# Patient Record
Sex: MALE | Race: WHITE | Employment: OTHER | URBAN - METROPOLITAN AREA
[De-identification: names, ages, dates, MRNs, and addresses within clinical notes are randomized per-mention and may not be internally consistent; named-entity substitution may affect disease eponyms.]

---

## 2017-01-03 ENCOUNTER — HOSPITAL ENCOUNTER (OUTPATIENT)
Dept: SLEEP MEDICINE | Age: 82
Discharge: HOME | End: 2017-01-05
Admitting: INTERNAL MEDICINE

## 2017-01-03 ENCOUNTER — OFFICE VISIT (OUTPATIENT)
Dept: SLEEP MEDICINE | Age: 82
End: 2017-01-03

## 2017-01-03 VITALS — OXYGEN SATURATION: 93 % | HEART RATE: 118 BPM | BODY MASS INDEX: 25.05 KG/M2 | WEIGHT: 175 LBS | HEIGHT: 70 IN

## 2017-01-03 DIAGNOSIS — K21.9 GERD WITHOUT ESOPHAGITIS: Chronic | ICD-10-CM

## 2017-01-03 DIAGNOSIS — G47.33 OBSTRUCTIVE SLEEP APNEA SYNDROME: Primary | ICD-10-CM

## 2017-01-03 DIAGNOSIS — G47.33 OBSTRUCTIVE SLEEP APNEA SYNDROME: ICD-10-CM

## 2017-01-03 DIAGNOSIS — I10 HYPERTENSION, ESSENTIAL: Chronic | ICD-10-CM

## 2017-01-03 DIAGNOSIS — E11.9 CONTROLLED TYPE 2 DIABETES MELLITUS WITHOUT COMPLICATION, UNSPECIFIED LONG TERM INSULIN USE STATUS: Chronic | ICD-10-CM

## 2017-01-03 PROCEDURE — 95811 POLYSOM 6/>YRS CPAP 4/> PARM: CPT | Performed by: INTERNAL MEDICINE

## 2017-01-03 PROCEDURE — 99214 OFFICE O/P EST MOD 30 MIN: CPT | Performed by: INTERNAL MEDICINE

## 2017-01-03 ASSESSMENT — ENCOUNTER SYMPTOMS
ABDOMINAL DISTENTION: 0
CHEST TIGHTNESS: 0
SHORTNESS OF BREATH: 0
SINUS PRESSURE: 0
STRIDOR: 0
ABDOMINAL PAIN: 0
APNEA: 1
EYE PAIN: 0
NAUSEA: 0
PHOTOPHOBIA: 0

## 2017-01-09 ENCOUNTER — TELEPHONE (OUTPATIENT)
Dept: SLEEP MEDICINE | Age: 82
End: 2017-01-09

## 2017-01-10 ENCOUNTER — TELEPHONE (OUTPATIENT)
Dept: SLEEP MEDICINE | Age: 82
End: 2017-01-10

## 2017-01-16 ENCOUNTER — TELEPHONE (OUTPATIENT)
Dept: SLEEP MEDICINE | Age: 82
End: 2017-01-16

## 2017-01-19 ENCOUNTER — TELEPHONE (OUTPATIENT)
Dept: SLEEP MEDICINE | Age: 82
End: 2017-01-19

## 2017-01-23 ENCOUNTER — TELEPHONE (OUTPATIENT)
Dept: SLEEP MEDICINE | Age: 82
End: 2017-01-23

## 2017-01-24 ENCOUNTER — TELEPHONE (OUTPATIENT)
Dept: SLEEP MEDICINE | Age: 82
End: 2017-01-24

## 2017-02-14 ENCOUNTER — HOSPITAL ENCOUNTER (OUTPATIENT)
Dept: OTHER | Age: 82
Discharge: HOME | End: 2017-02-14
Attending: FAMILY MEDICINE | Admitting: FAMILY MEDICINE

## 2017-02-14 LAB
A/G RATIO: 1.5 (ref 1.1–2.2)
ALBUMIN SERPL-MCNC: 4.2 G/DL (ref 3.4–5)
ALP BLD-CCNC: 76 U/L (ref 40–129)
ALT SERPL-CCNC: 17 U/L (ref 10–40)
ANION GAP SERPL CALCULATED.3IONS-SCNC: 15 MMOL/L (ref 3–16)
AST SERPL-CCNC: 23 U/L (ref 15–37)
BASOPHILS ABSOLUTE: 0 K/UL (ref 0–0.2)
BASOPHILS RELATIVE PERCENT: 0.5 %
BILIRUB SERPL-MCNC: 0.6 MG/DL (ref 0–1)
BUN BLDV-MCNC: 16 MG/DL (ref 7–20)
CALCIUM SERPL-MCNC: 9.5 MG/DL (ref 8.3–10.6)
CHLORIDE BLD-SCNC: 100 MMOL/L (ref 99–110)
CHOLESTEROL, TOTAL: 186 MG/DL (ref 0–199)
CO2: 25 MMOL/L (ref 21–32)
CREAT SERPL-MCNC: 0.9 MG/DL (ref 0.8–1.3)
EOSINOPHILS ABSOLUTE: 0.3 K/UL (ref 0–0.6)
EOSINOPHILS RELATIVE PERCENT: 5.2 %
GFR AFRICAN AMERICAN: >60
GFR NON-AFRICAN AMERICAN: >60
GLOBULIN: 2.8 G/DL
GLUCOSE BLD-MCNC: 92 MG/DL (ref 70–99)
HCT VFR BLD CALC: 44.1 % (ref 40.5–52.5)
HDLC SERPL-MCNC: 102 MG/DL (ref 40–60)
HEMOGLOBIN: 14 G/DL (ref 13.5–17.5)
LDL CHOLESTEROL CALCULATED: 73 MG/DL
LYMPHOCYTES ABSOLUTE: 1.4 K/UL (ref 1–5.1)
LYMPHOCYTES RELATIVE PERCENT: 27.7 %
MCH RBC QN AUTO: 31 PG (ref 26–34)
MCHC RBC AUTO-ENTMCNC: 31.8 G/DL (ref 31–36)
MCV RBC AUTO: 97.4 FL (ref 80–100)
MONOCYTES ABSOLUTE: 0.7 K/UL (ref 0–1.3)
MONOCYTES RELATIVE PERCENT: 14.2 %
NEUTROPHILS ABSOLUTE: 2.7 K/UL (ref 1.7–7.7)
NEUTROPHILS RELATIVE PERCENT: 52.4 %
PDW BLD-RTO: 17.9 % (ref 12.4–15.4)
PLATELET # BLD: 185 K/UL (ref 135–450)
PMV BLD AUTO: 8.6 FL (ref 5–10.5)
POTASSIUM SERPL-SCNC: 4.7 MMOL/L (ref 3.5–5.1)
RBC # BLD: 4.53 M/UL (ref 4.2–5.9)
SODIUM BLD-SCNC: 140 MMOL/L (ref 136–145)
TOTAL PROTEIN: 7 G/DL (ref 6.4–8.2)
TRIGL SERPL-MCNC: 56 MG/DL (ref 0–150)
TSH SERPL DL<=0.05 MIU/L-ACNC: 1.22 UIU/ML (ref 0.27–4.2)
VITAMIN D 25-HYDROXY: 40.9 NG/ML
VLDLC SERPL CALC-MCNC: 11 MG/DL
WBC # BLD: 5.1 K/UL (ref 4–11)

## 2017-02-15 LAB
ESTIMATED AVERAGE GLUCOSE: 102.5 MG/DL
HBA1C MFR BLD: 5.2 %

## 2017-02-20 ENCOUNTER — TELEPHONE (OUTPATIENT)
Dept: SLEEP MEDICINE | Age: 82
End: 2017-02-20

## 2017-03-17 ENCOUNTER — OFFICE VISIT (OUTPATIENT)
Dept: SLEEP MEDICINE | Age: 82
End: 2017-03-17

## 2017-03-17 VITALS
HEIGHT: 70 IN | DIASTOLIC BLOOD PRESSURE: 99 MMHG | HEART RATE: 105 BPM | SYSTOLIC BLOOD PRESSURE: 142 MMHG | OXYGEN SATURATION: 92 % | WEIGHT: 180 LBS | BODY MASS INDEX: 25.77 KG/M2

## 2017-03-17 DIAGNOSIS — G47.33 OBSTRUCTIVE SLEEP APNEA SYNDROME: Primary | Chronic | ICD-10-CM

## 2017-03-17 DIAGNOSIS — G47.50 PARASOMNIA: Chronic | ICD-10-CM

## 2017-03-17 DIAGNOSIS — E11.9 CONTROLLED TYPE 2 DIABETES MELLITUS WITHOUT COMPLICATION, UNSPECIFIED LONG TERM INSULIN USE STATUS: Chronic | ICD-10-CM

## 2017-03-17 DIAGNOSIS — I10 HYPERTENSION, ESSENTIAL: Chronic | ICD-10-CM

## 2017-03-17 PROCEDURE — 99214 OFFICE O/P EST MOD 30 MIN: CPT | Performed by: INTERNAL MEDICINE

## 2017-03-17 ASSESSMENT — SLEEP AND FATIGUE QUESTIONNAIRES
HOW LIKELY ARE YOU TO NOD OFF OR FALL ASLEEP WHILE SITTING INACTIVE IN A PUBLIC PLACE: 0
ESS TOTAL SCORE: 3
HOW LIKELY ARE YOU TO NOD OFF OR FALL ASLEEP WHILE SITTING QUIETLY AFTER LUNCH WITHOUT ALCOHOL: 0
HOW LIKELY ARE YOU TO NOD OFF OR FALL ASLEEP WHILE WATCHING TV: 1
HOW LIKELY ARE YOU TO NOD OFF OR FALL ASLEEP WHEN YOU ARE A PASSENGER IN A CAR FOR AN HOUR WITHOUT A BREAK: 1
HOW LIKELY ARE YOU TO NOD OFF OR FALL ASLEEP WHILE LYING DOWN TO REST IN THE AFTERNOON WHEN CIRCUMSTANCES PERMIT: 1
HOW LIKELY ARE YOU TO NOD OFF OR FALL ASLEEP IN A CAR, WHILE STOPPED FOR A FEW MINUTES IN TRAFFIC: 0
HOW LIKELY ARE YOU TO NOD OFF OR FALL ASLEEP WHILE SITTING AND TALKING TO SOMEONE: 0
HOW LIKELY ARE YOU TO NOD OFF OR FALL ASLEEP WHILE SITTING AND READING: 0

## 2017-03-17 ASSESSMENT — ENCOUNTER SYMPTOMS
EYE PAIN: 0
CHEST TIGHTNESS: 0
STRIDOR: 0
SINUS PRESSURE: 0
PHOTOPHOBIA: 0
SHORTNESS OF BREATH: 0

## 2017-05-25 ENCOUNTER — TELEPHONE (OUTPATIENT)
Dept: SLEEP MEDICINE | Age: 82
End: 2017-05-25

## 2017-06-19 ENCOUNTER — OFFICE VISIT (OUTPATIENT)
Dept: SLEEP MEDICINE | Age: 82
End: 2017-06-19

## 2017-06-19 VITALS
SYSTOLIC BLOOD PRESSURE: 124 MMHG | OXYGEN SATURATION: 96 % | HEIGHT: 70 IN | WEIGHT: 181 LBS | DIASTOLIC BLOOD PRESSURE: 78 MMHG | HEART RATE: 108 BPM | BODY MASS INDEX: 25.91 KG/M2

## 2017-06-19 DIAGNOSIS — G47.33 OBSTRUCTIVE SLEEP APNEA SYNDROME: Primary | Chronic | ICD-10-CM

## 2017-06-19 DIAGNOSIS — K21.9 GERD WITHOUT ESOPHAGITIS: Chronic | ICD-10-CM

## 2017-06-19 DIAGNOSIS — I10 HYPERTENSION, ESSENTIAL: Chronic | ICD-10-CM

## 2017-06-19 DIAGNOSIS — E11.9 CONTROLLED TYPE 2 DIABETES MELLITUS WITHOUT COMPLICATION, UNSPECIFIED LONG TERM INSULIN USE STATUS: Chronic | ICD-10-CM

## 2017-06-19 PROCEDURE — 99214 OFFICE O/P EST MOD 30 MIN: CPT | Performed by: INTERNAL MEDICINE

## 2017-06-19 ASSESSMENT — SLEEP AND FATIGUE QUESTIONNAIRES
HOW LIKELY ARE YOU TO NOD OFF OR FALL ASLEEP WHILE WATCHING TV: 0
HOW LIKELY ARE YOU TO NOD OFF OR FALL ASLEEP WHILE SITTING AND READING: 0
HOW LIKELY ARE YOU TO NOD OFF OR FALL ASLEEP WHILE SITTING INACTIVE IN A PUBLIC PLACE: 0
HOW LIKELY ARE YOU TO NOD OFF OR FALL ASLEEP WHILE SITTING AND TALKING TO SOMEONE: 0
NECK CIRCUMFERENCE (INCHES): 0
HOW LIKELY ARE YOU TO NOD OFF OR FALL ASLEEP IN A CAR, WHILE STOPPED FOR A FEW MINUTES IN TRAFFIC: 0
HOW LIKELY ARE YOU TO NOD OFF OR FALL ASLEEP WHILE SITTING QUIETLY AFTER LUNCH WITHOUT ALCOHOL: 0
HOW LIKELY ARE YOU TO NOD OFF OR FALL ASLEEP WHILE LYING DOWN TO REST IN THE AFTERNOON WHEN CIRCUMSTANCES PERMIT: 0
HOW LIKELY ARE YOU TO NOD OFF OR FALL ASLEEP WHEN YOU ARE A PASSENGER IN A CAR FOR AN HOUR WITHOUT A BREAK: 0
ESS TOTAL SCORE: 0

## 2017-06-19 ASSESSMENT — ENCOUNTER SYMPTOMS
CHEST TIGHTNESS: 0
EYE PAIN: 0
PHOTOPHOBIA: 0
SINUS PRESSURE: 0
SHORTNESS OF BREATH: 0
STRIDOR: 0

## 2017-08-14 ENCOUNTER — TELEPHONE (OUTPATIENT)
Dept: SLEEP MEDICINE | Age: 82
End: 2017-08-14

## 2017-08-22 ENCOUNTER — TELEPHONE (OUTPATIENT)
Dept: SLEEP MEDICINE | Age: 82
End: 2017-08-22

## 2018-01-29 ENCOUNTER — HOSPITAL ENCOUNTER (OUTPATIENT)
Dept: OTHER | Age: 83
Discharge: HOME | End: 2018-01-29
Attending: FAMILY MEDICINE | Admitting: FAMILY MEDICINE

## 2018-01-29 LAB
A/G RATIO: 1.4 (ref 1.1–2.2)
ALBUMIN SERPL-MCNC: 4.1 G/DL (ref 3.4–5)
ALP BLD-CCNC: 89 U/L (ref 40–129)
ALT SERPL-CCNC: 16 U/L (ref 10–40)
ANION GAP SERPL CALCULATED.3IONS-SCNC: 16 MMOL/L (ref 3–16)
AST SERPL-CCNC: 20 U/L (ref 15–37)
BASOPHILS ABSOLUTE: 0 K/UL (ref 0–0.2)
BASOPHILS RELATIVE PERCENT: 0.7 %
BILIRUB SERPL-MCNC: 0.6 MG/DL (ref 0–1)
BUN BLDV-MCNC: 16 MG/DL (ref 7–20)
CALCIUM SERPL-MCNC: 9.5 MG/DL (ref 8.3–10.6)
CHLORIDE BLD-SCNC: 103 MMOL/L (ref 99–110)
CHOLESTEROL, TOTAL: 190 MG/DL (ref 0–199)
CO2: 25 MMOL/L (ref 21–32)
CREAT SERPL-MCNC: 1 MG/DL (ref 0.8–1.3)
EOSINOPHILS ABSOLUTE: 0.4 K/UL (ref 0–0.6)
EOSINOPHILS RELATIVE PERCENT: 7.2 %
GFR AFRICAN AMERICAN: >60
GFR NON-AFRICAN AMERICAN: >60
GLOBULIN: 2.9 G/DL
GLUCOSE BLD-MCNC: 95 MG/DL (ref 70–99)
HCT VFR BLD CALC: 45.4 % (ref 40.5–52.5)
HDLC SERPL-MCNC: 90 MG/DL (ref 40–60)
HEMOGLOBIN: 15.3 G/DL (ref 13.5–17.5)
LDL CHOLESTEROL CALCULATED: 83 MG/DL
LYMPHOCYTES ABSOLUTE: 1.4 K/UL (ref 1–5.1)
LYMPHOCYTES RELATIVE PERCENT: 23.7 %
MCH RBC QN AUTO: 33.7 PG (ref 26–34)
MCHC RBC AUTO-ENTMCNC: 33.7 G/DL (ref 31–36)
MCV RBC AUTO: 99.8 FL (ref 80–100)
MONOCYTES ABSOLUTE: 0.7 K/UL (ref 0–1.3)
MONOCYTES RELATIVE PERCENT: 11.2 %
NEUTROPHILS ABSOLUTE: 3.3 K/UL (ref 1.7–7.7)
NEUTROPHILS RELATIVE PERCENT: 57.2 %
PDW BLD-RTO: 14.5 % (ref 12.4–15.4)
PLATELET # BLD: 214 K/UL (ref 135–450)
PMV BLD AUTO: 8.6 FL (ref 5–10.5)
POTASSIUM SERPL-SCNC: 4.4 MMOL/L (ref 3.5–5.1)
RBC # BLD: 4.55 M/UL (ref 4.2–5.9)
SODIUM BLD-SCNC: 144 MMOL/L (ref 136–145)
TOTAL PROTEIN: 7 G/DL (ref 6.4–8.2)
TRIGL SERPL-MCNC: 84 MG/DL (ref 0–150)
TSH SERPL DL<=0.05 MIU/L-ACNC: 2.07 UIU/ML (ref 0.27–4.2)
VLDLC SERPL CALC-MCNC: 17 MG/DL
WBC # BLD: 5.8 K/UL (ref 4–11)

## 2018-01-30 LAB
ESTIMATED AVERAGE GLUCOSE: 102.5 MG/DL
HBA1C MFR BLD: 5.2 %

## 2018-01-31 ENCOUNTER — TELEPHONE (OUTPATIENT)
Dept: SLEEP MEDICINE | Age: 83
End: 2018-01-31

## 2018-05-11 ENCOUNTER — HOSPITAL ENCOUNTER (OUTPATIENT)
Dept: OTHER | Age: 83
Discharge: HOME | End: 2018-05-11
Attending: NURSE PRACTITIONER | Admitting: NURSE PRACTITIONER

## 2018-05-11 LAB
ESTIMATED AVERAGE GLUCOSE: 99.7 MG/DL
HBA1C MFR BLD: 5.1 %

## 2018-06-22 ENCOUNTER — OFFICE VISIT (OUTPATIENT)
Dept: SLEEP MEDICINE | Age: 83
End: 2018-06-22

## 2018-06-22 VITALS
DIASTOLIC BLOOD PRESSURE: 68 MMHG | OXYGEN SATURATION: 94 % | WEIGHT: 181 LBS | HEART RATE: 80 BPM | BODY MASS INDEX: 25.91 KG/M2 | SYSTOLIC BLOOD PRESSURE: 118 MMHG | HEIGHT: 70 IN

## 2018-06-22 DIAGNOSIS — I10 HYPERTENSION, ESSENTIAL: Chronic | ICD-10-CM

## 2018-06-22 DIAGNOSIS — E11.9 CONTROLLED TYPE 2 DIABETES MELLITUS WITHOUT COMPLICATION, UNSPECIFIED LONG TERM INSULIN USE STATUS: Chronic | ICD-10-CM

## 2018-06-22 DIAGNOSIS — K21.9 GERD WITHOUT ESOPHAGITIS: Chronic | ICD-10-CM

## 2018-06-22 DIAGNOSIS — G47.33 OBSTRUCTIVE SLEEP APNEA SYNDROME: Primary | Chronic | ICD-10-CM

## 2018-06-22 PROCEDURE — 99214 OFFICE O/P EST MOD 30 MIN: CPT | Performed by: NURSE PRACTITIONER

## 2018-06-22 PROCEDURE — 1036F TOBACCO NON-USER: CPT | Performed by: NURSE PRACTITIONER

## 2018-06-22 PROCEDURE — G8419 CALC BMI OUT NRM PARAM NOF/U: HCPCS | Performed by: NURSE PRACTITIONER

## 2018-06-22 PROCEDURE — 4040F PNEUMOC VAC/ADMIN/RCVD: CPT | Performed by: NURSE PRACTITIONER

## 2018-06-22 PROCEDURE — G8427 DOCREV CUR MEDS BY ELIG CLIN: HCPCS | Performed by: NURSE PRACTITIONER

## 2018-06-22 PROCEDURE — 1123F ACP DISCUSS/DSCN MKR DOCD: CPT | Performed by: NURSE PRACTITIONER

## 2018-06-22 ASSESSMENT — ENCOUNTER SYMPTOMS
ABDOMINAL DISTENTION: 0
SINUS PRESSURE: 0
APNEA: 0
SHORTNESS OF BREATH: 0
RHINORRHEA: 0
COUGH: 0
ABDOMINAL PAIN: 0

## 2018-06-22 ASSESSMENT — SLEEP AND FATIGUE QUESTIONNAIRES
HOW LIKELY ARE YOU TO NOD OFF OR FALL ASLEEP WHILE LYING DOWN TO REST IN THE AFTERNOON WHEN CIRCUMSTANCES PERMIT: 1
HOW LIKELY ARE YOU TO NOD OFF OR FALL ASLEEP WHILE SITTING INACTIVE IN A PUBLIC PLACE: 0
HOW LIKELY ARE YOU TO NOD OFF OR FALL ASLEEP WHILE WATCHING TV: 0
ESS TOTAL SCORE: 2
HOW LIKELY ARE YOU TO NOD OFF OR FALL ASLEEP WHILE SITTING AND READING: 0
HOW LIKELY ARE YOU TO NOD OFF OR FALL ASLEEP IN A CAR, WHILE STOPPED FOR A FEW MINUTES IN TRAFFIC: 0
HOW LIKELY ARE YOU TO NOD OFF OR FALL ASLEEP WHILE SITTING AND TALKING TO SOMEONE: 0
HOW LIKELY ARE YOU TO NOD OFF OR FALL ASLEEP WHILE SITTING QUIETLY AFTER LUNCH WITHOUT ALCOHOL: 0
HOW LIKELY ARE YOU TO NOD OFF OR FALL ASLEEP WHEN YOU ARE A PASSENGER IN A CAR FOR AN HOUR WITHOUT A BREAK: 1

## 2018-11-01 ENCOUNTER — HOSPITAL ENCOUNTER (OUTPATIENT)
Dept: SURGERY | Age: 83
Setting detail: OUTPATIENT SURGERY
Discharge: HOME | End: 2018-11-01
Payer: MEDICARE

## 2018-11-01 VITALS — SYSTOLIC BLOOD PRESSURE: 150 MMHG | DIASTOLIC BLOOD PRESSURE: 91 MMHG

## 2018-11-01 PROCEDURE — 6370000000 HC RX 637 (ALT 250 FOR IP): Performed by: OPHTHALMOLOGY

## 2018-11-01 PROCEDURE — 66821 AFTER CATARACT LASER SURGERY: CPT

## 2018-11-01 RX ORDER — TROPICAMIDE 10 MG/ML
1 SOLUTION/ DROPS OPHTHALMIC ONCE
Status: COMPLETED | OUTPATIENT
Start: 2018-11-01 | End: 2018-11-01

## 2018-11-01 RX ORDER — PHENYLEPHRINE HCL 2.5 %
1 DROPS OPHTHALMIC (EYE) ONCE
Status: COMPLETED | OUTPATIENT
Start: 2018-11-01 | End: 2018-11-01

## 2018-11-01 RX ORDER — PROPARACAINE HYDROCHLORIDE 5 MG/ML
1 SOLUTION/ DROPS OPHTHALMIC ONCE
Status: COMPLETED | OUTPATIENT
Start: 2018-11-01 | End: 2018-11-01

## 2018-11-01 RX ADMIN — TROPICAMIDE 1 DROP: 10 SOLUTION/ DROPS OPHTHALMIC at 11:06

## 2018-11-01 RX ADMIN — PHENYLEPHRINE HYDROCHLORIDE 1 DROP: 25 SOLUTION/ DROPS OPHTHALMIC at 11:06

## 2018-11-01 RX ADMIN — PROPARACAINE HYDROCHLORIDE 1 DROP: 5 SOLUTION/ DROPS OPHTHALMIC at 11:06

## 2018-11-01 NOTE — PROGRESS NOTES
Ambulatory to laser room. Eye marked and numbed by Dr. Black Huynh procedure done and tolerated well by patient.   Post  instructions to pt by Dr. Gregorio Medicine setting was 1.7    # of shots was 22    Total power was 37.2

## 2022-02-24 ENCOUNTER — HOSPITAL ENCOUNTER (INPATIENT)
Age: 87
LOS: 3 days | Discharge: NURSING, REHAB OR LONG-TERM CARE | DRG: 494 | End: 2022-02-28
Attending: INTERNAL MEDICINE | Admitting: INTERNAL MEDICINE
Payer: MEDICARE

## 2022-02-24 DIAGNOSIS — S82.843A CLOSED BIMALLEOLAR FRACTURE WITH FRACTURE OF DISTAL FIBULA: Primary | ICD-10-CM

## 2022-02-24 DIAGNOSIS — S82.839A CLOSED BIMALLEOLAR FRACTURE WITH FRACTURE OF DISTAL FIBULA: Primary | ICD-10-CM

## 2022-02-24 PROCEDURE — 29515 APPLICATION SHORT LEG SPLINT: CPT

## 2022-02-24 PROCEDURE — 99285 EMERGENCY DEPT VISIT HI MDM: CPT

## 2022-02-24 ASSESSMENT — PAIN SCALES - GENERAL: PAINLEVEL_OUTOF10: 2

## 2022-02-25 ENCOUNTER — APPOINTMENT (OUTPATIENT)
Dept: GENERAL RADIOLOGY | Age: 87
DRG: 494 | End: 2022-02-25
Payer: MEDICARE

## 2022-02-25 ENCOUNTER — ANESTHESIA EVENT (OUTPATIENT)
Dept: OPERATING ROOM | Age: 87
DRG: 494 | End: 2022-02-25
Payer: MEDICARE

## 2022-02-25 PROBLEM — T14.8XXA FRACTURE: Status: ACTIVE | Noted: 2022-02-25

## 2022-02-25 LAB
A/G RATIO: 1.4 (ref 1.1–2.2)
ALBUMIN SERPL-MCNC: 3.6 G/DL (ref 3.4–5)
ALP BLD-CCNC: 85 U/L (ref 40–129)
ALT SERPL-CCNC: 15 U/L (ref 10–40)
ANION GAP SERPL CALCULATED.3IONS-SCNC: 7 MMOL/L (ref 3–16)
ANION GAP SERPL CALCULATED.3IONS-SCNC: 8 MMOL/L (ref 3–16)
AST SERPL-CCNC: 25 U/L (ref 15–37)
BASOPHILS ABSOLUTE: 0 K/UL (ref 0–0.2)
BASOPHILS ABSOLUTE: 0.1 K/UL (ref 0–0.2)
BASOPHILS RELATIVE PERCENT: 0.9 %
BASOPHILS RELATIVE PERCENT: 1.8 %
BILIRUB SERPL-MCNC: 0.3 MG/DL (ref 0–1)
BUN BLDV-MCNC: 30 MG/DL (ref 7–20)
BUN BLDV-MCNC: 30 MG/DL (ref 7–20)
CALCIUM SERPL-MCNC: 9.9 MG/DL (ref 8.3–10.6)
CALCIUM SERPL-MCNC: 9.9 MG/DL (ref 8.3–10.6)
CHLORIDE BLD-SCNC: 106 MMOL/L (ref 99–110)
CHLORIDE BLD-SCNC: 107 MMOL/L (ref 99–110)
CO2: 26 MMOL/L (ref 21–32)
CO2: 28 MMOL/L (ref 21–32)
CREAT SERPL-MCNC: 1 MG/DL (ref 0.8–1.3)
CREAT SERPL-MCNC: 1.1 MG/DL (ref 0.8–1.3)
EKG ATRIAL RATE: 81 BPM
EKG DIAGNOSIS: NORMAL
EKG P AXIS: 63 DEGREES
EKG P-R INTERVAL: 188 MS
EKG Q-T INTERVAL: 360 MS
EKG QRS DURATION: 88 MS
EKG QTC CALCULATION (BAZETT): 418 MS
EKG R AXIS: 63 DEGREES
EKG T AXIS: 56 DEGREES
EKG VENTRICULAR RATE: 81 BPM
EOSINOPHILS ABSOLUTE: 0.3 K/UL (ref 0–0.6)
EOSINOPHILS ABSOLUTE: 0.4 K/UL (ref 0–0.6)
EOSINOPHILS RELATIVE PERCENT: 6.5 %
EOSINOPHILS RELATIVE PERCENT: 6.8 %
ESTIMATED AVERAGE GLUCOSE: 102.5 MG/DL
GFR AFRICAN AMERICAN: >60
GFR AFRICAN AMERICAN: >60
GFR NON-AFRICAN AMERICAN: >60
GFR NON-AFRICAN AMERICAN: >60
GLUCOSE BLD-MCNC: 108 MG/DL (ref 70–99)
GLUCOSE BLD-MCNC: 152 MG/DL (ref 70–99)
GLUCOSE BLD-MCNC: 81 MG/DL (ref 70–99)
GLUCOSE BLD-MCNC: 83 MG/DL (ref 70–99)
GLUCOSE BLD-MCNC: 96 MG/DL (ref 70–99)
GLUCOSE BLD-MCNC: 97 MG/DL (ref 70–99)
HBA1C MFR BLD: 5.2 %
HCT VFR BLD CALC: 39.7 % (ref 40.5–52.5)
HCT VFR BLD CALC: 42.7 % (ref 40.5–52.5)
HEMOGLOBIN: 13.2 G/DL (ref 13.5–17.5)
HEMOGLOBIN: 14.6 G/DL (ref 13.5–17.5)
LYMPHOCYTES ABSOLUTE: 1 K/UL (ref 1–5.1)
LYMPHOCYTES ABSOLUTE: 1.1 K/UL (ref 1–5.1)
LYMPHOCYTES RELATIVE PERCENT: 19.6 %
LYMPHOCYTES RELATIVE PERCENT: 24.8 %
MCH RBC QN AUTO: 33.6 PG (ref 26–34)
MCH RBC QN AUTO: 34.1 PG (ref 26–34)
MCHC RBC AUTO-ENTMCNC: 33.4 G/DL (ref 31–36)
MCHC RBC AUTO-ENTMCNC: 34.2 G/DL (ref 31–36)
MCV RBC AUTO: 100.6 FL (ref 80–100)
MCV RBC AUTO: 99.9 FL (ref 80–100)
MONOCYTES ABSOLUTE: 0.4 K/UL (ref 0–1.3)
MONOCYTES ABSOLUTE: 0.5 K/UL (ref 0–1.3)
MONOCYTES RELATIVE PERCENT: 8.9 %
MONOCYTES RELATIVE PERCENT: 9.1 %
NEUTROPHILS ABSOLUTE: 2.7 K/UL (ref 1.7–7.7)
NEUTROPHILS ABSOLUTE: 3.3 K/UL (ref 1.7–7.7)
NEUTROPHILS RELATIVE PERCENT: 58.9 %
NEUTROPHILS RELATIVE PERCENT: 62.7 %
PDW BLD-RTO: 14.1 % (ref 12.4–15.4)
PDW BLD-RTO: 14.2 % (ref 12.4–15.4)
PERFORMED ON: ABNORMAL
PERFORMED ON: NORMAL
PLATELET # BLD: 198 K/UL (ref 135–450)
PLATELET # BLD: 202 K/UL (ref 135–450)
PMV BLD AUTO: 8.3 FL (ref 5–10.5)
PMV BLD AUTO: 8.4 FL (ref 5–10.5)
POTASSIUM REFLEX MAGNESIUM: 4.3 MMOL/L (ref 3.5–5.1)
POTASSIUM SERPL-SCNC: 4.4 MMOL/L (ref 3.5–5.1)
RBC # BLD: 3.94 M/UL (ref 4.2–5.9)
RBC # BLD: 4.27 M/UL (ref 4.2–5.9)
REASON FOR REJECTION: NORMAL
REJECTED TEST: NORMAL
SARS-COV-2, NAAT: NOT DETECTED
SODIUM BLD-SCNC: 140 MMOL/L (ref 136–145)
SODIUM BLD-SCNC: 142 MMOL/L (ref 136–145)
TOTAL PROTEIN: 6.2 G/DL (ref 6.4–8.2)
WBC # BLD: 4.5 K/UL (ref 4–11)
WBC # BLD: 5.3 K/UL (ref 4–11)

## 2022-02-25 PROCEDURE — 93005 ELECTROCARDIOGRAM TRACING: CPT | Performed by: NURSE PRACTITIONER

## 2022-02-25 PROCEDURE — 36415 COLL VENOUS BLD VENIPUNCTURE: CPT

## 2022-02-25 PROCEDURE — 99222 1ST HOSP IP/OBS MODERATE 55: CPT | Performed by: ORTHOPAEDIC SURGERY

## 2022-02-25 PROCEDURE — 1200000000 HC SEMI PRIVATE

## 2022-02-25 PROCEDURE — 73610 X-RAY EXAM OF ANKLE: CPT

## 2022-02-25 PROCEDURE — 6370000000 HC RX 637 (ALT 250 FOR IP): Performed by: NURSE PRACTITIONER

## 2022-02-25 PROCEDURE — 0QSJ04Z REPOSITION RIGHT FIBULA WITH INTERNAL FIXATION DEVICE, OPEN APPROACH: ICD-10-PCS | Performed by: ORTHOPAEDIC SURGERY

## 2022-02-25 PROCEDURE — 0QSG04Z REPOSITION RIGHT TIBIA WITH INTERNAL FIXATION DEVICE, OPEN APPROACH: ICD-10-PCS | Performed by: ORTHOPAEDIC SURGERY

## 2022-02-25 PROCEDURE — 80053 COMPREHEN METABOLIC PANEL: CPT

## 2022-02-25 PROCEDURE — 6370000000 HC RX 637 (ALT 250 FOR IP): Performed by: INTERNAL MEDICINE

## 2022-02-25 PROCEDURE — 83036 HEMOGLOBIN GLYCOSYLATED A1C: CPT

## 2022-02-25 PROCEDURE — 2580000003 HC RX 258: Performed by: INTERNAL MEDICINE

## 2022-02-25 PROCEDURE — 93010 ELECTROCARDIOGRAM REPORT: CPT | Performed by: INTERNAL MEDICINE

## 2022-02-25 PROCEDURE — 87635 SARS-COV-2 COVID-19 AMP PRB: CPT

## 2022-02-25 PROCEDURE — 85025 COMPLETE CBC W/AUTO DIFF WBC: CPT

## 2022-02-25 RX ORDER — SODIUM CHLORIDE 0.9 % (FLUSH) 0.9 %
10 SYRINGE (ML) INJECTION PRN
Status: DISCONTINUED | OUTPATIENT
Start: 2022-02-25 | End: 2022-02-28 | Stop reason: HOSPADM

## 2022-02-25 RX ORDER — MORPHINE SULFATE 2 MG/ML
2 INJECTION, SOLUTION INTRAMUSCULAR; INTRAVENOUS EVERY 4 HOURS PRN
Status: DISCONTINUED | OUTPATIENT
Start: 2022-02-25 | End: 2022-02-28 | Stop reason: HOSPADM

## 2022-02-25 RX ORDER — SODIUM CHLORIDE 9 MG/ML
25 INJECTION, SOLUTION INTRAVENOUS PRN
Status: DISCONTINUED | OUTPATIENT
Start: 2022-02-25 | End: 2022-02-28 | Stop reason: HOSPADM

## 2022-02-25 RX ORDER — SODIUM CHLORIDE 9 MG/ML
INJECTION, SOLUTION INTRAVENOUS CONTINUOUS
Status: ACTIVE | OUTPATIENT
Start: 2022-02-25 | End: 2022-02-26

## 2022-02-25 RX ORDER — ONDANSETRON 2 MG/ML
4 INJECTION INTRAMUSCULAR; INTRAVENOUS EVERY 6 HOURS PRN
Status: DISCONTINUED | OUTPATIENT
Start: 2022-02-25 | End: 2022-02-28 | Stop reason: HOSPADM

## 2022-02-25 RX ORDER — ACETAMINOPHEN 325 MG/1
650 TABLET ORAL EVERY 6 HOURS PRN
Status: DISCONTINUED | OUTPATIENT
Start: 2022-02-25 | End: 2022-02-28 | Stop reason: HOSPADM

## 2022-02-25 RX ORDER — PANTOPRAZOLE SODIUM 40 MG/1
40 TABLET, DELAYED RELEASE ORAL
Status: DISCONTINUED | OUTPATIENT
Start: 2022-02-25 | End: 2022-02-28 | Stop reason: HOSPADM

## 2022-02-25 RX ORDER — SODIUM CHLORIDE 0.9 % (FLUSH) 0.9 %
5-40 SYRINGE (ML) INJECTION EVERY 12 HOURS SCHEDULED
Status: DISCONTINUED | OUTPATIENT
Start: 2022-02-25 | End: 2022-02-28 | Stop reason: HOSPADM

## 2022-02-25 RX ORDER — DEXTROSE MONOHYDRATE 50 MG/ML
100 INJECTION, SOLUTION INTRAVENOUS PRN
Status: DISCONTINUED | OUTPATIENT
Start: 2022-02-25 | End: 2022-02-28 | Stop reason: HOSPADM

## 2022-02-25 RX ORDER — POLYETHYLENE GLYCOL 3350 17 G/17G
17 POWDER, FOR SOLUTION ORAL DAILY PRN
Status: DISCONTINUED | OUTPATIENT
Start: 2022-02-25 | End: 2022-02-28 | Stop reason: HOSPADM

## 2022-02-25 RX ORDER — INSULIN LISPRO 100 [IU]/ML
0-3 INJECTION, SOLUTION INTRAVENOUS; SUBCUTANEOUS NIGHTLY
Status: DISCONTINUED | OUTPATIENT
Start: 2022-02-25 | End: 2022-02-28 | Stop reason: HOSPADM

## 2022-02-25 RX ORDER — INSULIN LISPRO 100 [IU]/ML
0-6 INJECTION, SOLUTION INTRAVENOUS; SUBCUTANEOUS
Status: DISCONTINUED | OUTPATIENT
Start: 2022-02-25 | End: 2022-02-28 | Stop reason: HOSPADM

## 2022-02-25 RX ORDER — ACETAMINOPHEN 650 MG/1
650 SUPPOSITORY RECTAL EVERY 6 HOURS PRN
Status: DISCONTINUED | OUTPATIENT
Start: 2022-02-25 | End: 2022-02-28 | Stop reason: HOSPADM

## 2022-02-25 RX ORDER — ONDANSETRON 4 MG/1
4 TABLET, ORALLY DISINTEGRATING ORAL EVERY 8 HOURS PRN
Status: DISCONTINUED | OUTPATIENT
Start: 2022-02-25 | End: 2022-02-28 | Stop reason: HOSPADM

## 2022-02-25 RX ORDER — OXYCODONE HYDROCHLORIDE 5 MG/1
5 TABLET ORAL EVERY 4 HOURS PRN
Status: DISCONTINUED | OUTPATIENT
Start: 2022-02-25 | End: 2022-02-28 | Stop reason: HOSPADM

## 2022-02-25 RX ORDER — LANOLIN ALCOHOL/MO/W.PET/CERES
3 CREAM (GRAM) TOPICAL NIGHTLY PRN
Status: DISCONTINUED | OUTPATIENT
Start: 2022-02-25 | End: 2022-02-28 | Stop reason: HOSPADM

## 2022-02-25 RX ORDER — DEXTROSE MONOHYDRATE 100 MG/ML
12.5 INJECTION, SOLUTION INTRAVENOUS PRN
Status: DISCONTINUED | OUTPATIENT
Start: 2022-02-25 | End: 2022-02-28 | Stop reason: HOSPADM

## 2022-02-25 RX ORDER — HYDROCHLOROTHIAZIDE 25 MG/1
25 TABLET ORAL DAILY
Status: DISCONTINUED | OUTPATIENT
Start: 2022-02-25 | End: 2022-02-28 | Stop reason: HOSPADM

## 2022-02-25 RX ORDER — NICOTINE POLACRILEX 4 MG
15 LOZENGE BUCCAL PRN
Status: DISCONTINUED | OUTPATIENT
Start: 2022-02-25 | End: 2022-02-28 | Stop reason: HOSPADM

## 2022-02-25 RX ORDER — LISINOPRIL 20 MG/1
20 TABLET ORAL DAILY
Status: DISCONTINUED | OUTPATIENT
Start: 2022-02-25 | End: 2022-02-28 | Stop reason: HOSPADM

## 2022-02-25 RX ADMIN — MELATONIN TAB 3 MG 3 MG: 3 TAB at 20:17

## 2022-02-25 RX ADMIN — SODIUM CHLORIDE: 9 INJECTION, SOLUTION INTRAVENOUS at 06:33

## 2022-02-25 RX ADMIN — HYDROCHLOROTHIAZIDE 25 MG: 25 TABLET ORAL at 08:00

## 2022-02-25 RX ADMIN — LISINOPRIL 20 MG: 20 TABLET ORAL at 08:00

## 2022-02-25 RX ADMIN — ACETAMINOPHEN 650 MG: 325 TABLET ORAL at 20:17

## 2022-02-25 ASSESSMENT — PAIN SCALES - GENERAL
PAINLEVEL_OUTOF10: 0
PAINLEVEL_OUTOF10: 4
PAINLEVEL_OUTOF10: 0
PAINLEVEL_OUTOF10: 0

## 2022-02-25 ASSESSMENT — ENCOUNTER SYMPTOMS
SHORTNESS OF BREATH: 0
VOMITING: 0
NAUSEA: 0
CHEST TIGHTNESS: 0
DIARRHEA: 0
ABDOMINAL PAIN: 0

## 2022-02-25 ASSESSMENT — PAIN DESCRIPTION - FREQUENCY: FREQUENCY: CONTINUOUS

## 2022-02-25 ASSESSMENT — PAIN DESCRIPTION - PROGRESSION: CLINICAL_PROGRESSION: NOT CHANGED

## 2022-02-25 ASSESSMENT — PAIN DESCRIPTION - ONSET: ONSET: ON-GOING

## 2022-02-25 ASSESSMENT — PAIN DESCRIPTION - LOCATION: LOCATION: ANKLE

## 2022-02-25 ASSESSMENT — PAIN DESCRIPTION - DESCRIPTORS: DESCRIPTORS: DULL;ACHING

## 2022-02-25 ASSESSMENT — PAIN DESCRIPTION - PAIN TYPE: TYPE: ACUTE PAIN

## 2022-02-25 ASSESSMENT — PAIN DESCRIPTION - ORIENTATION: ORIENTATION: RIGHT

## 2022-02-25 NOTE — PROGRESS NOTES
Assessment completed. Patient alert and oriented x 4 and able to make all his needs known. Dressing to RLE CDI. Denies any pain at this time. Able to wiggle toes. B/P 153/93, medications administered with sips of water. Some SOB noted with exertion. Remains NPO. Uses bedside urinal. POC and education reviewed with patient and mutually agreed upon. All needs met at this time. Call light in reach. Will continue to monitor. 1130: Patient stated he wanted to go to the bathroom to have a BM. Education provided, assisted with walker and reminded not to bear weight on right leg.  1720: Patient requesting to go to bathroom again. Educated patient on NWB status. Patient agreeable to the use of bedside urinal in reach. Call light in reach. Will continue to monitor.

## 2022-02-25 NOTE — ANESTHESIA PRE PROCEDURE
Department of Anesthesiology  Preprocedure Note       Name:  Christi Palma   Age:  80 y.o.  :  1934                                          MRN:  [id number]         Date:  2022      Surgeon: Padma John):  Swetha Aquino MD    Procedure: Procedure(s):  OPEN REDUCTION INTERNAL FIXATION OF RIGHT BIMALLEOLAR ANKLE FRACTURE (Heather Arabella)    Medications prior to admission:   Prior to Admission medications    Medication Sig Start Date End Date Taking? Authorizing Provider   metformin (GLUCOPHAGE) 500 MG tablet Take 500 mg by mouth 2 times daily (with meals). Yes Historical Provider, MD   Cholecalciferol (VITAMIN D3) 2000 UNITS CAPS Take  by mouth daily. Yes Historical Provider, MD   Omega-3 Fatty Acids (FISH OIL) 600 MG CAPS Take  by mouth daily. Yes Historical Provider, MD   pantoprazole (PROTONIX) 40 MG tablet 1 tab po two times a day for 1 month and then 1 time a day 3/19/16   Sue, MD   quinapril (ACCUPRIL) 40 MG tablet Take 40 mg by mouth daily. Historical Provider, MD   hydrochlorothiazide (HYDRODIURIL) 25 MG tablet Take 25 mg by mouth daily. Historical Provider, MD   MULTIPLE VITAMIN PO Take  by mouth daily.     Historical Provider, MD       Current medications:    Current Facility-Administered Medications   Medication Dose Route Frequency Provider Last Rate Last Admin    hydroCHLOROthiazide (HYDRODIURIL) tablet 25 mg  25 mg Oral Daily Prashant Weaver MD   25 mg at 22 0800    pantoprazole (PROTONIX) tablet 40 mg  40 mg Oral QAM  Eleno Salgado MD        lisinopril (PRINIVIL;ZESTRIL) tablet 20 mg  20 mg Oral Daily Prashant Weaver MD   20 mg at 22 0800    insulin lispro (1 Unit Dial) 0-6 Units  0-6 Units SubCUTAneous TID WC Eleno Salgado MD        insulin lispro (1 Unit Dial) 0-3 Units  0-3 Units SubCUTAneous Nightly Eleno Hernandez MD        glucose (GLUTOSE) 40 % oral gel 15 g  15 g Oral PRN Prashant Weaver MD        dextrose 10 % infusion  12.5 mellitus ([facility] [address]     type 2    Hard of hearing     wears chidi aids    Hypertension     Obstructive sleep apnea syndrome 1/3/2017       Past Surgical History:        Procedure Laterality Date    KNEE ARTHROPLASTY Right     KNEE ARTHROSCOPY      right x1, left x2    KNEE SURGERY  6-20-13    RIGHT KNEE IRRIGATION AND DEBRIDEMENT KNEE WOUND    OTHER SURGICAL HISTORY      lt ring finger    PROSTATECTOMY      TONSILLECTOMY      UPPER GASTROINTESTINAL ENDOSCOPY  3/18/16    bleeding ulcer, clipped, epi and ablated       Social History:    Social History     Tobacco Use    Smoking status: Never Smoker    Smokeless tobacco: Never Used   Substance Use Topics    Alcohol use: Yes     Alcohol/week: 1.0 standard drink     Types: 1 Shots of liquor per week                                Counseling given: Not Answered      Vital Signs (Current):   Vitals:    02/25/22 0348 02/25/22 0358 02/25/22 0549 02/25/22 0745   BP: 134/80 (!) 144/92 (!) 141/92 (!) 153/93   Pulse:   78 98   Resp:   16 17   Temp:   36.4 °C (97.6 °F) 36.2 °C (97.1 °F)   TempSrc:   Oral Oral   SpO2: 96% 95% 96% 96%   Weight:       Height:                                                  BP Readings from Last 3 Encounters:   02/25/22 (!) 153/93   11/01/18 (!) 150/91   06/22/18 118/68       NPO Status:                                                                                 BMI:   Wt Readings from Last 3 Encounters:   02/24/22 170 lb (77.1 kg)   06/22/18 181 lb (82.1 kg)   06/19/17 181 lb (82.1 kg)     Body mass index is 24.39 kg/m².     CBC:   Lab Results   Component Value Date    WBC 4.5 02/25/2022    RBC 3.94 02/25/2022    HGB 13.2 02/25/2022    HCT 39.7 02/25/2022    .6 02/25/2022    RDW 14.1 02/25/2022     02/25/2022       CMP:   Lab Results   Component Value Date     02/25/2022    K 4.3 02/25/2022    K 4.4 02/25/2022     02/25/2022    CO2 28 02/25/2022    BUN 30 02/25/2022    CREATININE 1.1 02/25/2022    GFRAA >60 02/25/2022    GFRAA >60 06/11/2013    AGRATIO 1.4 02/25/2022    LABGLOM >60 02/25/2022    GLUCOSE 97 02/25/2022    PROT 6.2 02/25/2022    CALCIUM 9.9 02/25/2022    BILITOT 0.3 02/25/2022    ALKPHOS 85 02/25/2022    AST 25 02/25/2022    ALT 15 02/25/2022       POC Tests:   Recent Labs     02/25/22  0721   POCGLU 81       Coags:   Lab Results   Component Value Date    PROTIME 12.3 03/17/2016    INR 1.08 03/17/2016    APTT 28.0 03/17/2016       HCG (If Applicable): No results found for: PREGTESTUR, PREGSERUM, HCG, HCGQUANT     ABGs: No results found for: PHART, PO2ART, GCO0ZQK, VKO0PTE, BEART, U9EIUUSA     Type & Screen (If Applicable):  Lab Results   Component Value Date    LABABO O 05/30/2013    [address] Positive 05/30/2013       Drug/Infectious Status (If Applicable):  No results found for: HIV, HEPCAB    COVID-19 Screening (If Applicable):   Lab Results   Component Value Date    COVID19 Not Detected 02/25/2022           Anesthesia Evaluation  Patient summary reviewed and Nursing notes reviewed no history of anesthetic complications:   Airway:         Dental:          Pulmonary:Negative Pulmonary ROS   (+) sleep apnea:                             Cardiovascular:    (+) hypertension:,       ECG reviewed                        Neuro/Psych:   Negative Neuro/Psych ROS              GI/Hepatic/Renal:             Endo/Other:    (+) DiabetesType II DM, , : arthritis:., .                 Abdominal:             Vascular: Other Findings:             Anesthesia Plan      general     ASA 2     (Chart review)  Induction: intravenous.                           AUSTIN Hernadez - CRNA   2/25/2022

## 2022-02-25 NOTE — PROGRESS NOTES
[id number] [facility] Orthopedic Surgery  Consult Note          Orthopedic Consult, full note to follow. Benita Collier 80 y.o. admitted for a fall with right ankle pain. Xray reviewed, and showed right ankle displaced bimalleolar fracture    Plan:  - Recommend ORIF right ankle  - No OR time available today  - Surgery tomorrow Sat 8:00 AM    Thank you very much for the kind consultation and allowing me to participate in this patient's care. I will continue to keep you apprised of his progress.          Suzy Clark MD, 2/25/2022 7:25 AM

## 2022-02-25 NOTE — CARE COORDINATION
Discharge Planning Note:      Patient is from:    [facility]  [address], [address]: [phone]    Patient able to return upon discharge. Will need a precert. Will continue to follow.     BAO Mathews RN      Phone: [phone]

## 2022-02-25 NOTE — PROGRESS NOTES
[facility]    2/25/2022 8:46 AM        Name: Christi Palma . Admitted: 2/24/2022  Primary Care Provider: Remigio Mccauley MD (Tel: [phone])      Chief Complaint   Patient presents with    Ankle Pain     pt fell yesterday, got xrays today w/ an acute oblique displaced fracture. good pedal pulses denies hitting head , no other c/o. pt able to stand and pivot for ems. ABCs intact gcs 15 pain 2/10     Brief History: Patient is an 79 yo male with hx arthritis, prostate cancer, DM2, HTN, CLARITZA. He lives with wife in assisted living, wife currently out of state visiting daughter. Patient presented to ER with pain right ankle after mechanical fall at home on 2/23, \"I lost my balance. \" Found to have fracture distal right fibula, splint applied in ER. Has been seen by orthopedics and ORIF scheduled for 8am tomorrow. Subjective:  Presently resting in bed. Disappointed to learn surgery will not be until tomorrow, anxious for DC home. Reports pain right ankle but tolerable. Denies chest pain, shortness of breath, palpitations, abdominal pain, nausea.      Reviewed interval ancillary notes    Current Medications  hydroCHLOROthiazide (HYDRODIURIL) tablet 25 mg, Daily  pantoprazole (PROTONIX) tablet 40 mg, QAM AC  lisinopril (PRINIVIL;ZESTRIL) tablet 20 mg, Daily  insulin lispro (1 Unit Dial) 0-6 Units, TID WC  insulin lispro (1 Unit Dial) 0-3 Units, Nightly  glucose (GLUTOSE) 40 % oral gel 15 g, PRN  dextrose 10 % infusion, PRN  glucagon (rDNA) injection 1 mg, PRN  dextrose 5 % solution, PRN  sodium chloride flush 0.9 % injection 5-40 mL, 2 times per day  sodium chloride flush 0.9 % injection 10 mL, PRN  0.9 % sodium chloride infusion, PRN  enoxaparin (LOVENOX) injection 40 mg, Daily  ondansetron (ZOFRAN-ODT) disintegrating tablet 4 mg, Q8H PRN   Or  ondansetron (ZOFRAN) injection 4 mg, Q6H PRN  polyethylene glycol (GLYCOLAX) packet 17 g, Daily PRN  acetaminophen (TYLENOL) tablet 650 mg, Q6H PRN   Or  acetaminophen (TYLENOL) suppository 650 mg, Q6H PRN  oxyCODONE (ROXICODONE) immediate release tablet 5 mg, Q4H PRN  morphine (PF) injection 2 mg, Q4H PRN  0.9 % sodium chloride infusion, Continuous        Objective:  BP (!) 153/93   Pulse 98   Temp 97.1 °F (36.2 °C) (Oral)   Resp 17   Ht 5' 10\" (1.778 m)   Wt 170 lb (77.1 kg)   SpO2 96%   BMI 24.39 kg/m²     Intake/Output Summary (Last 24 hours) at 2/25/2022 0846  Last data filed at 2/25/2022 0745  Gross per 24 hour   Intake    Output 200 ml   Net -200 ml      Wt Readings from Last 3 Encounters:   02/24/22 170 lb (77.1 kg)   06/22/18 181 lb (82.1 kg)   06/19/17 181 lb (82.1 kg)       General appearance:  Appears comfortable  Eyes: Sclera clear. Pupils equal.  ENT: Moist oral mucosa. Trachea midline, no adenopathy. Cardiovascular: Regular rhythm, normal S1, S2. No murmur. No edema in lower extremities  Respiratory: Not using accessory muscles. Good inspiratory effort. Clear to auscultation bilaterally, no wheeze or crackles. GI: Abdomen soft, no tenderness, not distended, normal bowel sounds  Musculoskeletal: No cyanosis in digits, neck supple  Neurology: CN 2-12 grossly intact. No speech or motor deficits  Psych: Normal affect. Alert and oriented in time, place and person  Skin: Warm, dry, normal turgor    Labs and Tests:  CBC:   Recent Labs     02/25/22  0255 02/25/22 0627   WBC 5.3 4.5   HGB 14.6 13.2*    198     BMP:    Recent Labs     02/25/22  0330 02/25/22 0627    142   K 4.4 4.3    107   CO2 26 28   BUN 30* 30*   CREATININE 1.0 1.1   GLUCOSE 108* 97     Hepatic:   Recent Labs     02/25/22 0627   AST 25   ALT 15   BILITOT 0.3   ALKPHOS 85       Xray Right Ankle 2/25/2022:  1.  Acute fracture of the distal right fibula as above remaining in continuity   with the right talus, which appears to be minimally subluxed laterally. There is no evident involvement of the ankle mortise. 2. Suspected acute avulsion fractures from the right medial malleolus and/or   talar body with overlying soft tissue swelling. Problem List  Principal Problem:    Fracture  Resolved Problems:    * No resolved hospital problems. *       Assessment & Plan:   1. Right ankle fracture. Secondary mechanical fall at home. Splint in place. NPO after MN for surgery in am.   2. DM2. Takes metformin at home, held on admission. Being covered with low dose correction. BG valies controlled. 3. HTN. Controlled. Continue lisinopril and HCTZ. 4. CLARITZA, untreated. Diet: ADULT DIET;  Regular  Diet NPO Exceptions are: Ice Chips, Sips of Water with Meds  Code:Full Code  DVT PPX: enoxaparin      AUSTIN Duggan CNP   2/25/2022 8:46 AM

## 2022-02-25 NOTE — ED TRIAGE NOTES
pt fell yesterday, got xrays today w/ an acute oblique displaced fracture. good pedal pulses denies hitting head , no other c/o. pt able to stand and pivot for ems.  ABCs intact gcs 15 pain 2/10

## 2022-02-25 NOTE — H&P
Hospital Medicine History and Physical    2/25/2022    Date of Admission: 2/25/2022    Date of Service: Pt seen/examined on 2/25/2022 and admitted to inpatient. Assessment/plan:  1. Right ankle fracture. Orthopedic surgery planning to take to OR later today. N.p.o. for now. As needed Tylenol, oxycodone, ordered for pain. 2. Other comorbidities: history of prostate cancer, diabetes type 2, essential hypertension, obstructive sleep apnea. Activities: Bedrest  Prophylaxis: Subcutaneous lovenox  Code status: Full code    ==========================================================  Chief complaint:  Chief Complaint   Patient presents with    Ankle Pain     pt fell yesterday, got xrays today w/ an acute oblique displaced fracture. good pedal pulses denies hitting head , no other c/o. pt able to stand and pivot for ems. ABCs intact gcs 15 pain 2/10       History of Presenting Illness: This is a pleasant 80 y.o. male with history of prostate cancer, diabetes type 2, essential hypertension, obstructive sleep apnea, who presents to the emergency room for evaluation of pain. Patient reports a recent fall complicated by right pain, happened while patient was ambulating, treat and ended up twisting his right ankle during the fall on 2/23/2022. He denies hitting his head. X-ray obtained in the emergency room reveals distal right fibula fracture. Surgery has recommended admission with plan for OR later today.       Past Medical History:      Diagnosis Date    Arthritis     Cancer [facility])     prostate    Diabetes mellitus ([facility] [address]     type 2    Hard of hearing     wears chidi aids    Hypertension     Obstructive sleep apnea syndrome 1/3/2017       Past Surgical History:      Procedure Laterality Date    KNEE ARTHROPLASTY Right     KNEE ARTHROSCOPY      right x1, left x2    KNEE SURGERY  6-20-13    RIGHT KNEE IRRIGATION AND DEBRIDEMENT KNEE WOUND    OTHER SURGICAL HISTORY      lt ring finger    PROSTATECTOMY      TONSILLECTOMY      UPPER GASTROINTESTINAL ENDOSCOPY  3/18/16    bleeding ulcer, clipped, epi and ablated       Medications (prior to admission):  Prior to Admission medications    Medication Sig Start Date End Date Taking? Authorizing Provider   pantoprazole (PROTONIX) 40 MG tablet 1 tab po two times a day for 1 month and then 1 time a day 3/19/16   Isabel Ring MD   metformin (GLUCOPHAGE) 500 MG tablet Take 500 mg by mouth 2 times daily (with meals). Historical Provider, MD   quinapril (ACCUPRIL) 40 MG tablet Take 40 mg by mouth daily. Historical Provider, MD   hydrochlorothiazide (HYDRODIURIL) 25 MG tablet Take 25 mg by mouth daily. Historical Provider, MD   Cholecalciferol (VITAMIN D3) 2000 UNITS CAPS Take  by mouth daily. Historical Provider, MD   MULTIPLE VITAMIN PO Take  by mouth daily. Historical Provider, MD   Omega-3 Fatty Acids (FISH OIL) 600 MG CAPS Take  by mouth daily. Historical Provider, MD       Allergy(ies):  Patient has no known allergies. Social History:  TOBACCO:  reports that he has never smoked. He has never used smokeless tobacco.  ETOH:  reports current alcohol use of about 1.0 standard drink of alcohol per week. Family History:      Problem Relation Age of Onset    High Blood Pressure Mother     Heart Disease Father        Review of Systems:  Pertinent positives are listed in HPI. At least 10-point ROS reviewed and were negative. Vitals and physical examination:  /78   Pulse 89   Temp 97.3 °F (36.3 °C) (Oral)   Resp 16   Ht 5' 10\" (1.778 m)   Wt 170 lb (77.1 kg)   SpO2 92%   BMI 24.39 kg/m²   Gen/overall appearance: Not in acute distress. Alert. Oriented x3. Hard of hearing. Head: Normocephalic, atraumatic  Eyes: EOMI, good acuity  ENT: Oral mucosa moist  Neck: No JVD, thyromegaly  CVS: Nml S1S2, no MRG, RRR  Pulm: Clear bilaterally.  No crackles/wheezes  Gastrointestinal: Soft, NT/ND, +BS  Musculoskeletal: Stabilizer with ace wrap to right lower extremity. No edema. Warm  Neuro: No focal deficit. Moves extremity spontaneously. Psychiatry: Appropriate affect. Not agitated. Skin: Warm, dry with normal turgor. No rash  Capillary refill: Brisk,< 3 seconds   Peripheral Pulses: +2 palpable, equal bilaterally       Labs/imaging/EKG:  CBC:   Recent Labs     02/25/22  0255   WBC 5.3   HGB 14.6        BMP:    Recent Labs     02/25/22  0330      K 4.4      CO2 26   BUN 30*   CREATININE 1.0   GLUCOSE 108*     Hepatic: No results for input(s): AST, ALT, ALB, BILITOT, ALKPHOS in the last 72 hours. XR ANKLE RIGHT (MIN 3 VIEWS)    Result Date: 2/25/2022  EXAMINATION: THREE XRAY VIEWS OF THE RIGHT ANKLE 2/25/2022 12:26 am COMPARISON: None HISTORY: ORDERING SYSTEM PROVIDED HISTORY: distal fib fracture TECHNOLOGIST PROVIDED HISTORY: Reason for exam:->distal fib fracture Reason for Exam: R/Ankle Pain from fall FINDINGS: Acute oblique fracture involving the fibular distal metaphysis with mild lateral and minimal posterior displacement. Tiny bony fragments along the inferior margin of the tibial medial malleolus and lateral margin of the talar body. Suspected minimal lateral subluxation of the talus with respect to the tibia. Intact ankle mortise. Calcaneal enthesophyte at the insertion of the Achilles tendon. Mild soft tissue swelling in the medial hindfoot and anterior ankle. 1. Acute fracture of the distal right fibula as above remaining in continuity with the right talus, which appears to be minimally subluxed laterally. There is no evident involvement of the ankle mortise. 2. Suspected acute avulsion fractures from the right medial malleolus and/or talar body with overlying soft tissue swelling. EKG: EKG is pending. Discussed with ER NP.       Thank you Ronald Prasad MD for the opportunity to be involved in this patient's care.    -----------------------------  MD Bruno Regalado hospitalist

## 2022-02-25 NOTE — PROGRESS NOTES
Admission assessment completed, pt is alert and oriented x4, VSS, fall precautions in place, call light within reach, denies any pain, pt is aware of NPO status for surgery today. See flowsheets, will monitor pt. The care plan and education has been reviewed and mutually agreed upon with the patient.

## 2022-02-25 NOTE — PROGRESS NOTES
Spoke to TROY Lala, about pt requesting going to the bathroom. Instructed by TROY Lala, to assist pt to bathroom while frequently reminding pt to maintain NWB status to right foot. NWB status maintained. Gait belt utilized, Karl Ibarra RN instructor in to assist. Pt tolerated well no complaints of pain and returned to bed. RN aware. No further requests at this time.      Electronically signed by Ev Concepcion RN, Luzmaria Bruner [facility] instructor on 2/25/2022 at 3:09 PM

## 2022-02-25 NOTE — PLAN OF CARE
Problem: Falls - Risk of:  Goal: Will remain free from falls  Description: Will remain free from falls  2/25/2022 0950 by María Lawler RN  Outcome: Ongoing  2/25/2022 0626 by Dannielle Brown RN  Outcome: Ongoing  Goal: Absence of physical injury  Description: Absence of physical injury  2/25/2022 0950 by María Lawler RN  Outcome: Ongoing  2/25/2022 0626 by Dannielle Brown RN  Outcome: Ongoing     Problem: Sensory Perception - Impaired:  Goal: Ability to maintain a stable neurologic state will improve  Description: Ability to maintain a stable neurologic state will improve  2/25/2022 0950 by María Lawler RN  Outcome: Ongoing  2/25/2022 0626 by Dannielle Brown RN  Outcome: Ongoing     Problem: Serum Glucose Level - Abnormal:  Goal: Ability to maintain appropriate glucose levels will improve  Description: Ability to maintain appropriate glucose levels will improve  2/25/2022 0950 by María Lawler RN  Outcome: Ongoing  2/25/2022 0626 by Dannielle Brown RN  Outcome: Ongoing     Problem: Serum Glucose Level - Abnormal:  Goal: Ability to maintain appropriate glucose levels will improve  Description: Ability to maintain appropriate glucose levels will improve  2/25/2022 0950 by María Lawler RN  Outcome: Ongoing  2/25/2022 0626 by Dannielle Brown RN  Outcome: Ongoing     Problem: Sensory Perception - Impaired:  Goal: Ability to maintain a stable neurologic state will improve  Description: Ability to maintain a stable neurologic state will improve  2/25/2022 0950 by María Lawler RN  Outcome: Ongoing  2/25/2022 0626 by Dannielle Brown RN  Outcome: Ongoing     Problem: Sensory Perception - Impaired:  Goal: Ability to maintain a stable neurologic state will improve  Description: Ability to maintain a stable neurologic state will improve  2/25/2022 0950 by María Lawler RN  Outcome: Ongoing  2/25/2022 0626 by Dannielle Brown RN  Outcome: Ongoing

## 2022-02-25 NOTE — ED PROVIDER NOTES
[facility]        Pt Name: Palma Atkins  MRN: [id number]  [place] 1934  Date of evaluation: 2/24/2022  Provider: AUSTIN Laura - CNP  PCP: Tricia Dinero MD  Note Started: 12:06 AM EST       ALYSIA. I have evaluated this patient. My supervising physician was available for consultation. CHIEF COMPLAINT       Chief Complaint   Patient presents with    Ankle Pain     pt fell yesterday, got xrays today w/ an acute oblique displaced fracture. good pedal pulses denies hitting head , no other c/o. pt able to stand and pivot for ems. ABCs intact gcs 15 pain 2/10       HISTORY OF PRESENT ILLNESS   (Location, Timing/Onset, Context/Setting, Quality, Duration, Modifying Factors, Severity, Associated Signs and Symptoms)  Note limiting factors. Chief Complaint: right ankle fracture     Palma Atkins is a 80 y.o. male who presents to the emergency department with right ankle fracture. The patient reports that he tripped, twisting the right ankle and fell yesterday. No other injury. He reports that weightbearing is difficult/painful. No numbness or tingling present. Mobile x-ray did show an acute oblique displaced fracture of the distal fibula. Patient was sent to the emergency department for evaluation and treatment. Denies any headache, fever, lightheadedness, dizziness, visual disturbances. No chest pain or pressure. No neck or back pain. No shortness of breath, cough, or congestion. No abdominal pain, nausea, vomiting, diarrhea, constipation, or dysuria. No rash. Nursing Notes were all reviewed and agreed with or any disagreements were addressed in the HPI. REVIEW OF SYSTEMS    (2-9 systems for level 4, 10 or more for level 5)     Review of Systems   Constitutional: Negative for activity change, chills and fever. Respiratory: Negative for chest tightness and shortness of breath. Cardiovascular: Negative for chest pain. Gastrointestinal: Negative for abdominal pain, diarrhea, nausea and vomiting. Genitourinary: Negative for dysuria. Musculoskeletal:        Right ankle swelling   All other systems reviewed and are negative. Positives and Pertinent negatives as per HPI. Except as noted above in the ROS, all other systems were reviewed and negative. PAST MEDICAL HISTORY     Past Medical History:   Diagnosis Date    Arthritis     Cancer [facility])     prostate    Diabetes mellitus ([facility] [address]     type 2    Hard of hearing     wears chidi aids    Hypertension     Obstructive sleep apnea syndrome 1/3/2017         SURGICAL HISTORY     Past Surgical History:   Procedure Laterality Date    KNEE ARTHROPLASTY Right     KNEE ARTHROSCOPY      right x1, left x2    KNEE SURGERY  6-20-13    RIGHT KNEE IRRIGATION AND DEBRIDEMENT KNEE WOUND    OTHER SURGICAL HISTORY      lt ring finger    PROSTATECTOMY      TONSILLECTOMY      UPPER GASTROINTESTINAL ENDOSCOPY  3/18/16    bleeding ulcer, clipped, epi and ablated         CURRENTMEDICATIONS       Previous Medications    CHOLECALCIFEROL (VITAMIN D3) 2000 UNITS CAPS    Take  by mouth daily. HYDROCHLOROTHIAZIDE (HYDRODIURIL) 25 MG TABLET    Take 25 mg by mouth daily. METFORMIN (GLUCOPHAGE) 500 MG TABLET    Take 500 mg by mouth 2 times daily (with meals). MULTIPLE VITAMIN PO    Take  by mouth daily. OMEGA-3 FATTY ACIDS (FISH OIL) 600 MG CAPS    Take  by mouth daily. PANTOPRAZOLE (PROTONIX) 40 MG TABLET    1 tab po two times a day for 1 month and then 1 time a day    QUINAPRIL (ACCUPRIL) 40 MG TABLET    Take 40 mg by mouth daily. ALLERGIES     Patient has no known allergies.     FAMILYHISTORY       Family History   Problem Relation Age of Onset    High Blood Pressure Mother     Heart Disease Father           SOCIAL HISTORY       Social History     Tobacco Use    Smoking status: Never Smoker    Smokeless tobacco: Never Used Vaping Use    Vaping Use: Never used   Substance Use Topics    Alcohol use: Yes     Alcohol/week: 1.0 standard drink     Types: 1 Shots of liquor per week    Drug use: Not on file       SCREENINGS    [place] Coma Scale  Eye Opening: Spontaneous  Best Verbal Response: Oriented  Best Motor Response: Obeys commands  Tyler Coma Scale Score: 15        PHYSICAL EXAM    (up to 7 for level 4, 8 or more for level 5)     ED Triage Vitals   BP Temp Temp Source Pulse Resp SpO2 Height Weight   02/24/22 2338 02/24/22 2334 02/24/22 2334 02/24/22 2338 02/24/22 2338 02/24/22 2338 02/24/22 2334 02/24/22 2334   114/78 97.3 °F (36.3 °C) Oral 89 16 92 % 5' 10\" (1.778 m) 170 lb (77.1 kg)       Physical Exam  Vitals and nursing note reviewed. Constitutional:       Appearance: He is well-developed. He is not diaphoretic. HENT:      Head: Normocephalic and atraumatic. Right Ear: External ear normal.      Left Ear: External ear normal.   Eyes:      General:         Right eye: No discharge. Left eye: No discharge. Neck:      Vascular: No JVD. Cardiovascular:      Rate and Rhythm: Normal rate. Pulses: Normal pulses. Radial pulses are 2+ on the right side. Dorsalis pedis pulses are 2+ on the right side. Pulmonary:      Effort: Pulmonary effort is normal. No respiratory distress. Breath sounds: Normal breath sounds. Abdominal:      Palpations: Abdomen is soft. Musculoskeletal:         General: Normal range of motion. Cervical back: Normal range of motion and neck supple. Right ankle: Swelling present. Tenderness present over the lateral malleolus and medial malleolus. Skin:     General: Skin is warm and dry. Coloration: Skin is not pale. Neurological:      Mental Status: He is alert and oriented to person, place, and time.    Psychiatric:         Behavior: Behavior normal.         DIAGNOSTIC RESULTS   LABS:    Labs Reviewed   COVID-19, RAPID   CBC WITH AUTO DIFFERENTIAL   BASIC METABOLIC PANEL       When ordered only abnormal lab results are displayed. All other labs were within normal range or not returned as of this dictation. EKG: When ordered, EKG's are interpreted by the Emergency Department Physician in the absence of a cardiologist.  Please see their note for interpretation of EKG. RADIOLOGY:   Non-plain film images such as CT, Ultrasound and MRI are read by the radiologist. Plain radiographic images are visualized and preliminarily interpreted by the ED Provider with the below findings:        Interpretation per the Radiologist below, if available at the time of this note:    XR ANKLE RIGHT (MIN 3 VIEWS)   Final Result   1. Acute fracture of the distal right fibula as above remaining in continuity   with the right talus, which appears to be minimally subluxed laterally. There is no evident involvement of the ankle mortise. 2. Suspected acute avulsion fractures from the right medial malleolus and/or   talar body with overlying soft tissue swelling. No results found. PROCEDURES   Unless otherwise noted below, none     Splint Application    Date/Time: 2/25/2022 2:13 AM  Performed by: AUSTIN Baltazar CNP  Authorized by: AUSTIN Baltazar CNP     Consent:     Consent obtained:  Verbal    Consent given by:  Patient    Risks discussed:  Discoloration, numbness, pain and swelling  Pre-procedure details:     Sensation:  Normal  Procedure details:     Laterality:  Right    Location:  Ankle    Ankle:  R ankle    Splint type:  Short leg (short leg and sugar tong right leg ocl)    Supplies:  Ortho-Glass  Post-procedure details:     Pain:  Improved    Sensation:  Normal    Patient tolerance of procedure:   Tolerated well, no immediate complications        CRITICAL CARE TIME       CONSULTS:  IP CONSULT TO ORTHOPEDIC SURGERY      EMERGENCY DEPARTMENT COURSE and DIFFERENTIAL DIAGNOSIS/MDM:   Vitals:    Vitals:    02/24/22 2334 02/24/22 2338   BP:  114/78   Pulse:  89   Resp:  16   Temp: 97.3 °F (36.3 °C)    TempSrc: Oral    SpO2:  92%   Weight: 170 lb (77.1 kg)    Height: 5' 10\" (1.778 m)        Patient was given the following medications:  Medications   oxyCODONE (ROXICODONE) immediate release tablet 5 mg (has no administration in time range)   morphine (PF) injection 2 mg (has no administration in time range)           Briefly, this is an 80 male from [place] [address] this evening with fracture of right lower extremity. patient tripped and fell while at [place] [address] yesterday where he lives independently with his cat. He did injure his right ankle. Mobile xray concern for fracture - patient sent to ER for eval    pain to lateral and medial mal right lower extremity, no obvious deformity    Impression:    1. Acute fracture of the distal right fibula as above remaining in continuity  with the right talus, which appears to be minimally subluxed laterally. There is no evident involvement of the ankle mortise. 2. Suspected acute avulsion fractures from the right medial malleolus and/or  talar body with overlying soft tissue swelling. will splint with sugartong and short leg ocl right    patient does not have level of care needed to return to independent living, I will admit him to hospitalist for NWB fracture and likely need for surgical repair. Dr. Anca Drummond, consulted from ER, he requested patient be NPO and rapid covid with see patient in the AM and repair. Splint placed and checked by myself. Pre-op labs ordered. Patient admitted to hospitalist.      FINAL IMPRESSION      1. Closed bimalleolar fracture with fracture of distal fibula          DISPOSITION/PLAN   DISPOSITION Decision To Admit 02/25/2022 01:56:59 AM      PATIENT REFERRED TO:  No follow-up provider specified.     DISCHARGE MEDICATIONS:  New Prescriptions    No medications on file       DISCONTINUED MEDICATIONS:  Discontinued Medications    No medications on file              (Please note that portions of this note were completed with a voice recognition program.  Efforts were made to edit the dictations but occasionally words are mis-transcribed.)    AUSTIN Marcus CNP (electronically signed)           AUSTIN Marcus CNP  02/25/22 [address] AUSTIN Jasso CNP  02/25/22 3904

## 2022-02-26 ENCOUNTER — ANESTHESIA (OUTPATIENT)
Dept: OPERATING ROOM | Age: 87
DRG: 494 | End: 2022-02-26
Payer: MEDICARE

## 2022-02-26 ENCOUNTER — APPOINTMENT (OUTPATIENT)
Dept: GENERAL RADIOLOGY | Age: 87
DRG: 494 | End: 2022-02-26
Payer: MEDICARE

## 2022-02-26 VITALS
SYSTOLIC BLOOD PRESSURE: 116 MMHG | OXYGEN SATURATION: 100 % | RESPIRATION RATE: 2 BRPM | TEMPERATURE: 96.1 F | DIASTOLIC BLOOD PRESSURE: 62 MMHG

## 2022-02-26 LAB
A/G RATIO: 1.4 (ref 1.1–2.2)
ALBUMIN SERPL-MCNC: 3.1 G/DL (ref 3.4–5)
ALP BLD-CCNC: 69 U/L (ref 40–129)
ALT SERPL-CCNC: 17 U/L (ref 10–40)
ANION GAP SERPL CALCULATED.3IONS-SCNC: 7 MMOL/L (ref 3–16)
AST SERPL-CCNC: 23 U/L (ref 15–37)
BASOPHILS ABSOLUTE: 0 K/UL (ref 0–0.2)
BASOPHILS RELATIVE PERCENT: 0.5 %
BILIRUB SERPL-MCNC: 0.4 MG/DL (ref 0–1)
BUN BLDV-MCNC: 28 MG/DL (ref 7–20)
CALCIUM SERPL-MCNC: 8.9 MG/DL (ref 8.3–10.6)
CHLORIDE BLD-SCNC: 109 MMOL/L (ref 99–110)
CO2: 25 MMOL/L (ref 21–32)
CREAT SERPL-MCNC: 1.1 MG/DL (ref 0.8–1.3)
EOSINOPHILS ABSOLUTE: 0.4 K/UL (ref 0–0.6)
EOSINOPHILS RELATIVE PERCENT: 8.2 %
GFR AFRICAN AMERICAN: >60
GFR NON-AFRICAN AMERICAN: >60
GLUCOSE BLD-MCNC: 101 MG/DL (ref 70–99)
GLUCOSE BLD-MCNC: 111 MG/DL (ref 70–99)
GLUCOSE BLD-MCNC: 119 MG/DL (ref 70–99)
GLUCOSE BLD-MCNC: 123 MG/DL (ref 70–99)
GLUCOSE BLD-MCNC: 86 MG/DL (ref 70–99)
GLUCOSE BLD-MCNC: 94 MG/DL (ref 70–99)
GLUCOSE BLD-MCNC: 94 MG/DL (ref 70–99)
HCT VFR BLD CALC: 37 % (ref 40.5–52.5)
HEMOGLOBIN: 12.5 G/DL (ref 13.5–17.5)
LYMPHOCYTES ABSOLUTE: 1.3 K/UL (ref 1–5.1)
LYMPHOCYTES RELATIVE PERCENT: 27 %
MCH RBC QN AUTO: 33.8 PG (ref 26–34)
MCHC RBC AUTO-ENTMCNC: 33.8 G/DL (ref 31–36)
MCV RBC AUTO: 100.2 FL (ref 80–100)
MONOCYTES ABSOLUTE: 0.5 K/UL (ref 0–1.3)
MONOCYTES RELATIVE PERCENT: 9.9 %
NEUTROPHILS ABSOLUTE: 2.6 K/UL (ref 1.7–7.7)
NEUTROPHILS RELATIVE PERCENT: 54.4 %
PDW BLD-RTO: 14.4 % (ref 12.4–15.4)
PERFORMED ON: ABNORMAL
PERFORMED ON: NORMAL
PERFORMED ON: NORMAL
PLATELET # BLD: 170 K/UL (ref 135–450)
PMV BLD AUTO: 8.2 FL (ref 5–10.5)
POTASSIUM REFLEX MAGNESIUM: 3.9 MMOL/L (ref 3.5–5.1)
RBC # BLD: 3.7 M/UL (ref 4.2–5.9)
SODIUM BLD-SCNC: 141 MMOL/L (ref 136–145)
TOTAL PROTEIN: 5.3 G/DL (ref 6.4–8.2)
WBC # BLD: 4.8 K/UL (ref 4–11)

## 2022-02-26 PROCEDURE — A4217 STERILE WATER/SALINE, 500 ML: HCPCS | Performed by: ORTHOPAEDIC SURGERY

## 2022-02-26 PROCEDURE — 2580000003 HC RX 258: Performed by: ORTHOPAEDIC SURGERY

## 2022-02-26 PROCEDURE — 6370000000 HC RX 637 (ALT 250 FOR IP): Performed by: INTERNAL MEDICINE

## 2022-02-26 PROCEDURE — 6360000002 HC RX W HCPCS: Performed by: ORTHOPAEDIC SURGERY

## 2022-02-26 PROCEDURE — 6370000000 HC RX 637 (ALT 250 FOR IP): Performed by: ORTHOPAEDIC SURGERY

## 2022-02-26 PROCEDURE — 2720000010 HC SURG SUPPLY STERILE: Performed by: ORTHOPAEDIC SURGERY

## 2022-02-26 PROCEDURE — 27829 TREAT LOWER LEG JOINT: CPT | Performed by: ORTHOPAEDIC SURGERY

## 2022-02-26 PROCEDURE — 3700000000 HC ANESTHESIA ATTENDED CARE: Performed by: ORTHOPAEDIC SURGERY

## 2022-02-26 PROCEDURE — 6360000002 HC RX W HCPCS: Performed by: ANESTHESIOLOGY

## 2022-02-26 PROCEDURE — 94760 N-INVAS EAR/PLS OXIMETRY 1: CPT

## 2022-02-26 PROCEDURE — 73600 X-RAY EXAM OF ANKLE: CPT

## 2022-02-26 PROCEDURE — 2500000003 HC RX 250 WO HCPCS: Performed by: ANESTHESIOLOGY

## 2022-02-26 PROCEDURE — 1200000000 HC SEMI PRIVATE

## 2022-02-26 PROCEDURE — 85025 COMPLETE CBC W/AUTO DIFF WBC: CPT

## 2022-02-26 PROCEDURE — 7100000000 HC PACU RECOVERY - FIRST 15 MIN: Performed by: ORTHOPAEDIC SURGERY

## 2022-02-26 PROCEDURE — 3600000014 HC SURGERY LEVEL 4 ADDTL 15MIN: Performed by: ORTHOPAEDIC SURGERY

## 2022-02-26 PROCEDURE — C1713 ANCHOR/SCREW BN/BN,TIS/BN: HCPCS | Performed by: ORTHOPAEDIC SURGERY

## 2022-02-26 PROCEDURE — 3700000001 HC ADD 15 MINUTES (ANESTHESIA): Performed by: ORTHOPAEDIC SURGERY

## 2022-02-26 PROCEDURE — 2580000003 HC RX 258: Performed by: ANESTHESIOLOGY

## 2022-02-26 PROCEDURE — 27814 TREATMENT OF ANKLE FRACTURE: CPT | Performed by: ORTHOPAEDIC SURGERY

## 2022-02-26 PROCEDURE — 94150 VITAL CAPACITY TEST: CPT

## 2022-02-26 PROCEDURE — 3209999900 FLUORO FOR SURGICAL PROCEDURES

## 2022-02-26 PROCEDURE — 2500000003 HC RX 250 WO HCPCS: Performed by: ORTHOPAEDIC SURGERY

## 2022-02-26 PROCEDURE — 2709999900 HC NON-CHARGEABLE SUPPLY: Performed by: ORTHOPAEDIC SURGERY

## 2022-02-26 PROCEDURE — 7100000001 HC PACU RECOVERY - ADDTL 15 MIN: Performed by: ORTHOPAEDIC SURGERY

## 2022-02-26 PROCEDURE — 36415 COLL VENOUS BLD VENIPUNCTURE: CPT

## 2022-02-26 PROCEDURE — 3600000004 HC SURGERY LEVEL 4 BASE: Performed by: ORTHOPAEDIC SURGERY

## 2022-02-26 PROCEDURE — 80053 COMPREHEN METABOLIC PANEL: CPT

## 2022-02-26 DEVICE — SCREW BNE L16MM DIA2.7MM HEX HD DIA2.5MM CANC BIODUR 108C: Type: IMPLANTABLE DEVICE | Site: ANKLE | Status: FUNCTIONAL

## 2022-02-26 DEVICE — SCREW BNE L16MM DIA2.7MM LNG CORT FT ANK S STL ST: Type: IMPLANTABLE DEVICE | Site: ANKLE | Status: FUNCTIONAL

## 2022-02-26 DEVICE — SCREW BNE L20MM DIA2.7MM HEX HD DIA2.5MM CANC BIODUR 108C: Type: IMPLANTABLE DEVICE | Site: ANKLE | Status: FUNCTIONAL

## 2022-02-26 DEVICE — SCREW BNE L18MM DIA2.7MM HEX HD DIA2.5MM CANC BIODUR 108C: Type: IMPLANTABLE DEVICE | Site: ANKLE | Status: FUNCTIONAL

## 2022-02-26 DEVICE — SCREW BNE L16MM DIA3.5MM HD DIA2.7MM PERIARTC CORT S STL ST: Type: IMPLANTABLE DEVICE | Site: ANKLE | Status: FUNCTIONAL

## 2022-02-26 DEVICE — PLATE BNE L106MM 6 H R LAT DST PERIARTC FIBULAR S STL LOK: Type: IMPLANTABLE DEVICE | Site: HIP | Status: FUNCTIONAL

## 2022-02-26 DEVICE — IMPLANTABLE DEVICE: Type: IMPLANTABLE DEVICE | Site: ANKLE | Status: FUNCTIONAL

## 2022-02-26 DEVICE — SCREW BNE L60MM DIA3.5MM HD DIA2.7MM PERIARTC CORT S STL ST: Type: IMPLANTABLE DEVICE | Site: ANKLE | Status: FUNCTIONAL

## 2022-02-26 DEVICE — SCREW BNE L14MM DIA3.5MM HD DIA2.7MM CORT PERIARTC S STL ST: Type: IMPLANTABLE DEVICE | Site: ANKLE | Status: FUNCTIONAL

## 2022-02-26 RX ORDER — ONDANSETRON 2 MG/ML
INJECTION INTRAMUSCULAR; INTRAVENOUS PRN
Status: DISCONTINUED | OUTPATIENT
Start: 2022-02-26 | End: 2022-02-26 | Stop reason: SDUPTHER

## 2022-02-26 RX ORDER — FENTANYL CITRATE 50 UG/ML
25 INJECTION, SOLUTION INTRAMUSCULAR; INTRAVENOUS EVERY 5 MIN PRN
Status: DISCONTINUED | OUTPATIENT
Start: 2022-02-26 | End: 2022-02-26 | Stop reason: HOSPADM

## 2022-02-26 RX ORDER — DEXAMETHASONE SODIUM PHOSPHATE 4 MG/ML
INJECTION, SOLUTION INTRA-ARTICULAR; INTRALESIONAL; INTRAMUSCULAR; INTRAVENOUS; SOFT TISSUE PRN
Status: DISCONTINUED | OUTPATIENT
Start: 2022-02-26 | End: 2022-02-26 | Stop reason: SDUPTHER

## 2022-02-26 RX ORDER — ONDANSETRON 2 MG/ML
4 INJECTION INTRAMUSCULAR; INTRAVENOUS
Status: DISCONTINUED | OUTPATIENT
Start: 2022-02-26 | End: 2022-02-26 | Stop reason: HOSPADM

## 2022-02-26 RX ORDER — PROPOFOL 10 MG/ML
INJECTION, EMULSION INTRAVENOUS PRN
Status: DISCONTINUED | OUTPATIENT
Start: 2022-02-26 | End: 2022-02-26 | Stop reason: SDUPTHER

## 2022-02-26 RX ORDER — ACETAMINOPHEN 325 MG/1
650 TABLET ORAL
Status: DISCONTINUED | OUTPATIENT
Start: 2022-02-26 | End: 2022-02-26 | Stop reason: HOSPADM

## 2022-02-26 RX ORDER — BUPIVACAINE HYDROCHLORIDE 5 MG/ML
INJECTION, SOLUTION EPIDURAL; INTRACAUDAL
Status: COMPLETED | OUTPATIENT
Start: 2022-02-26 | End: 2022-02-26

## 2022-02-26 RX ORDER — FENTANYL CITRATE 50 UG/ML
INJECTION, SOLUTION INTRAMUSCULAR; INTRAVENOUS PRN
Status: DISCONTINUED | OUTPATIENT
Start: 2022-02-26 | End: 2022-02-26 | Stop reason: SDUPTHER

## 2022-02-26 RX ORDER — PROCHLORPERAZINE EDISYLATE 5 MG/ML
5 INJECTION INTRAMUSCULAR; INTRAVENOUS
Status: DISCONTINUED | OUTPATIENT
Start: 2022-02-26 | End: 2022-02-26 | Stop reason: HOSPADM

## 2022-02-26 RX ORDER — OXYCODONE HYDROCHLORIDE 5 MG/1
5 TABLET ORAL
Status: DISCONTINUED | OUTPATIENT
Start: 2022-02-26 | End: 2022-02-26 | Stop reason: HOSPADM

## 2022-02-26 RX ORDER — SODIUM CHLORIDE 0.9 % (FLUSH) 0.9 %
5-40 SYRINGE (ML) INJECTION PRN
Status: DISCONTINUED | OUTPATIENT
Start: 2022-02-26 | End: 2022-02-26 | Stop reason: HOSPADM

## 2022-02-26 RX ORDER — HYDRALAZINE HYDROCHLORIDE 20 MG/ML
10 INJECTION INTRAMUSCULAR; INTRAVENOUS
Status: DISCONTINUED | OUTPATIENT
Start: 2022-02-26 | End: 2022-02-26 | Stop reason: HOSPADM

## 2022-02-26 RX ORDER — SODIUM CHLORIDE 0.9 % (FLUSH) 0.9 %
5-40 SYRINGE (ML) INJECTION EVERY 12 HOURS SCHEDULED
Status: DISCONTINUED | OUTPATIENT
Start: 2022-02-26 | End: 2022-02-28 | Stop reason: HOSPADM

## 2022-02-26 RX ORDER — LIDOCAINE HYDROCHLORIDE 20 MG/ML
INJECTION, SOLUTION EPIDURAL; INFILTRATION; INTRACAUDAL; PERINEURAL PRN
Status: DISCONTINUED | OUTPATIENT
Start: 2022-02-26 | End: 2022-02-26 | Stop reason: SDUPTHER

## 2022-02-26 RX ORDER — LABETALOL HYDROCHLORIDE 5 MG/ML
10 INJECTION, SOLUTION INTRAVENOUS
Status: DISCONTINUED | OUTPATIENT
Start: 2022-02-26 | End: 2022-02-26 | Stop reason: HOSPADM

## 2022-02-26 RX ORDER — SODIUM CHLORIDE 9 MG/ML
INJECTION, SOLUTION INTRAVENOUS CONTINUOUS PRN
Status: DISCONTINUED | OUTPATIENT
Start: 2022-02-26 | End: 2022-02-26 | Stop reason: SDUPTHER

## 2022-02-26 RX ORDER — SODIUM CHLORIDE 9 MG/ML
25 INJECTION, SOLUTION INTRAVENOUS PRN
Status: DISCONTINUED | OUTPATIENT
Start: 2022-02-26 | End: 2022-02-28 | Stop reason: HOSPADM

## 2022-02-26 RX ORDER — PHENYLEPHRINE HCL IN 0.9% NACL 1 MG/10 ML
SYRINGE (ML) INTRAVENOUS PRN
Status: DISCONTINUED | OUTPATIENT
Start: 2022-02-26 | End: 2022-02-26 | Stop reason: SDUPTHER

## 2022-02-26 RX ORDER — SODIUM CHLORIDE 450 MG/100ML
INJECTION, SOLUTION INTRAVENOUS CONTINUOUS
Status: DISCONTINUED | OUTPATIENT
Start: 2022-02-26 | End: 2022-02-28 | Stop reason: HOSPADM

## 2022-02-26 RX ORDER — SODIUM CHLORIDE 0.9 % (FLUSH) 0.9 %
5-40 SYRINGE (ML) INJECTION PRN
Status: DISCONTINUED | OUTPATIENT
Start: 2022-02-26 | End: 2022-02-28 | Stop reason: HOSPADM

## 2022-02-26 RX ORDER — HYDROMORPHONE HCL 110MG/55ML
0.25 PATIENT CONTROLLED ANALGESIA SYRINGE INTRAVENOUS EVERY 5 MIN PRN
Status: DISCONTINUED | OUTPATIENT
Start: 2022-02-26 | End: 2022-02-26 | Stop reason: HOSPADM

## 2022-02-26 RX ORDER — SODIUM CHLORIDE 9 MG/ML
25 INJECTION, SOLUTION INTRAVENOUS PRN
Status: DISCONTINUED | OUTPATIENT
Start: 2022-02-26 | End: 2022-02-26 | Stop reason: HOSPADM

## 2022-02-26 RX ORDER — SODIUM CHLORIDE 0.9 % (FLUSH) 0.9 %
5-40 SYRINGE (ML) INJECTION EVERY 12 HOURS SCHEDULED
Status: DISCONTINUED | OUTPATIENT
Start: 2022-02-26 | End: 2022-02-26 | Stop reason: HOSPADM

## 2022-02-26 RX ADMIN — ASPIRIN 325 MG: 325 TABLET, COATED ORAL at 10:42

## 2022-02-26 RX ADMIN — HYDROMORPHONE HYDROCHLORIDE 0.25 MG: 2 INJECTION, SOLUTION INTRAMUSCULAR; INTRAVENOUS; SUBCUTANEOUS at 09:30

## 2022-02-26 RX ADMIN — SODIUM CHLORIDE: 9 INJECTION, SOLUTION INTRAVENOUS at 08:10

## 2022-02-26 RX ADMIN — FENTANYL CITRATE 50 MCG: 50 INJECTION, SOLUTION INTRAMUSCULAR; INTRAVENOUS at 08:25

## 2022-02-26 RX ADMIN — Medication 100 MCG: at 08:37

## 2022-02-26 RX ADMIN — LISINOPRIL 20 MG: 20 TABLET ORAL at 10:42

## 2022-02-26 RX ADMIN — Medication 10 ML: at 21:21

## 2022-02-26 RX ADMIN — DEXAMETHASONE SODIUM PHOSPHATE 4 MG: 4 INJECTION, SOLUTION INTRAMUSCULAR; INTRAVENOUS at 08:16

## 2022-02-26 RX ADMIN — MORPHINE SULFATE 2 MG: 2 INJECTION, SOLUTION INTRAMUSCULAR; INTRAVENOUS at 21:28

## 2022-02-26 RX ADMIN — Medication 100 MCG: at 08:47

## 2022-02-26 RX ADMIN — HYDROCHLOROTHIAZIDE 25 MG: 25 TABLET ORAL at 10:42

## 2022-02-26 RX ADMIN — PANTOPRAZOLE SODIUM 40 MG: 40 TABLET, DELAYED RELEASE ORAL at 06:15

## 2022-02-26 RX ADMIN — ASPIRIN 325 MG: 325 TABLET, COATED ORAL at 21:21

## 2022-02-26 RX ADMIN — PROPOFOL 80 MG: 10 INJECTION, EMULSION INTRAVENOUS at 08:16

## 2022-02-26 RX ADMIN — ONDANSETRON 4 MG: 2 INJECTION INTRAMUSCULAR; INTRAVENOUS at 08:16

## 2022-02-26 RX ADMIN — CEFAZOLIN SODIUM 2000 MG: 10 INJECTION, POWDER, FOR SOLUTION INTRAVENOUS at 16:20

## 2022-02-26 RX ADMIN — MELATONIN TAB 3 MG 3 MG: 3 TAB at 21:28

## 2022-02-26 RX ADMIN — LIDOCAINE HYDROCHLORIDE 80 MG: 20 INJECTION, SOLUTION EPIDURAL; INFILTRATION; INTRACAUDAL; PERINEURAL at 08:16

## 2022-02-26 RX ADMIN — PROPOFOL 20 MG: 10 INJECTION, EMULSION INTRAVENOUS at 08:20

## 2022-02-26 RX ADMIN — FENTANYL CITRATE 50 MCG: 50 INJECTION, SOLUTION INTRAMUSCULAR; INTRAVENOUS at 08:14

## 2022-02-26 RX ADMIN — SODIUM CHLORIDE: 4.5 INJECTION, SOLUTION INTRAVENOUS at 09:45

## 2022-02-26 RX ADMIN — CEFAZOLIN 2000 MG: 10 INJECTION, POWDER, FOR SOLUTION INTRAVENOUS at 08:25

## 2022-02-26 RX ADMIN — Medication 100 MCG: at 08:16

## 2022-02-26 ASSESSMENT — PULMONARY FUNCTION TESTS
PIF_VALUE: 2
PIF_VALUE: 0
PIF_VALUE: 10
PIF_VALUE: 2
PIF_VALUE: 0
PIF_VALUE: 2
PIF_VALUE: 10
PIF_VALUE: 10
PIF_VALUE: 2
PIF_VALUE: 2
PIF_VALUE: 0
PIF_VALUE: 2
PIF_VALUE: 11
PIF_VALUE: 2
PIF_VALUE: 1
PIF_VALUE: 10
PIF_VALUE: 2
PIF_VALUE: 2
PIF_VALUE: 10
PIF_VALUE: 1
PIF_VALUE: 10
PIF_VALUE: 1
PIF_VALUE: 2
PIF_VALUE: 10
PIF_VALUE: 1
PIF_VALUE: 10
PIF_VALUE: 10
PIF_VALUE: 2
PIF_VALUE: 2
PIF_VALUE: 10
PIF_VALUE: 2
PIF_VALUE: 10
PIF_VALUE: 2
PIF_VALUE: 1
PIF_VALUE: 2
PIF_VALUE: 10
PIF_VALUE: 10
PIF_VALUE: 1
PIF_VALUE: 1
PIF_VALUE: 2
PIF_VALUE: 2
PIF_VALUE: 10
PIF_VALUE: 2
PIF_VALUE: 10
PIF_VALUE: 16
PIF_VALUE: 10
PIF_VALUE: 2
PIF_VALUE: 10
PIF_VALUE: 4
PIF_VALUE: 3
PIF_VALUE: 2
PIF_VALUE: 10
PIF_VALUE: 10
PIF_VALUE: 2
PIF_VALUE: 10
PIF_VALUE: 1
PIF_VALUE: 2
PIF_VALUE: 30
PIF_VALUE: 1
PIF_VALUE: 10
PIF_VALUE: 2
PIF_VALUE: 10

## 2022-02-26 ASSESSMENT — PAIN SCALES - GENERAL
PAINLEVEL_OUTOF10: 0
PAINLEVEL_OUTOF10: 0
PAINLEVEL_OUTOF10: 6
PAINLEVEL_OUTOF10: 0
PAINLEVEL_OUTOF10: 6

## 2022-02-26 ASSESSMENT — ENCOUNTER SYMPTOMS: SHORTNESS OF BREATH: 0

## 2022-02-26 NOTE — PROGRESS NOTES
Incentive Spirometry education and demonstration completed by Respiratory Therapy Yes      Response to education: Excellent     Teaching Time: 5 minutes    Minimum Predicted Vital Capacity - 730 mL. Patient's Actual Vital Capacity - 1000 mL. Turning over to Nursing for routine follow-up Yes.     Electronically signed by Lane Heath RCP on 2/26/2022 at 11:31 AM

## 2022-02-26 NOTE — ANESTHESIA POSTPROCEDURE EVALUATION
Department of Anesthesiology  Postprocedure Note    Patient: Rocio Silva  MRN: [id number]  YOB: 1934  Date of evaluation: 2/26/2022  Time:  9:27 AM     Procedure Summary     Date: 02/26/22 Room / Location: [address]    Anesthesia Start: 9139 Anesthesia Stop: 9696    Procedure: OPEN REDUCTION INTERNAL FIXATION OF RIGHT BIMALLEOLAR ANKLE FRACTURE (Darylene Pinna) (Right Hip) Diagnosis: (Right Ankle Fracture)    Surgeons: Ethan Mclaughlin MD Responsible Provider: Reyna Choi MD    Anesthesia Type: general ASA Status: 2          Anesthesia Type: general    Miguelangel Phase I:      Miguelangel Phase II:      Last vitals: Reviewed and per EMR flowsheets.        Anesthesia Post Evaluation    Patient location during evaluation: PACU  Patient participation: complete - patient participated  Level of consciousness: awake and alert  Airway patency: patent  Nausea & Vomiting: no nausea and no vomiting  Complications: no  Cardiovascular status: hemodynamically stable  Respiratory status: acceptable  Hydration status: stable  Multimodal analgesia pain management approach

## 2022-02-26 NOTE — PROGRESS NOTES
PM assessment completed. Pt resting well in bed, reports dull, aching pain of right ankle. PRN tylenol administered with PRN melatonin for sleep. No further needs voiced. Fall precautions in place, hourly rounding, call light and belongings in reach, bed in lowest position, wheels locked in place, side rails up x 2, walkways free of clutter. Bed alarm on. Video monitoring remains in use for safety.   Pt aware to be NPO after midnight for surgery tomorrow 0800 with Dr. Harsha Love

## 2022-02-26 NOTE — PROGRESS NOTES
Assessment completed. Patient Andreafski but alert and oriented x 4 and able to make all his needs known. Denied any pain to RLE. Neuro checks WNL. Splint/dressing in place. Remains on bedrest. NPO. POC and education reviewed with patient and mutually agreed upon. Transport here to take patient for procedure. 1030: Patient returned to unit from PACU via bed, alert and oriented but a little drowsy. Dressing to RLE CDI, neuro checks WNL, able to wiggle toes. NWB on RLE. Elevated leg and ice applied. Denies any pain at this time. Call light in reach. Will continue to monitor.

## 2022-02-26 NOTE — OP NOTE
[facility]                     [facility], [address]                                OPERATIVE REPORT    PATIENT NAME: Collette Lai                  :        1934  MED REC NO:   [id number]                          ROOM:       4468  ACCOUNT NO:   [de-identified]                           ADMIT DATE: 2022  PROVIDER:     Prosper Mann MD    DATE OF PROCEDURE:  2022    PRIMARY CARE PHYSICIAN:  Bina Hughes MD    PREOPERATIVE DIAGNOSES:  1. Right ankle bimalleolar displaced fracture. 2.  Right ankle distal tibiofibular syndesmosis disruption. POSTOPERATIVE DIAGNOSES:  1. Right ankle bimalleolar displaced fracture. 2.  Right ankle distal tibiofibular syndesmosis disruption. OPERATION PERFORMED:  1. Open treatment of right ankle bimalleolar fracture with open  reduction and internal fixation. 2.  Open treatment of right ankle distal tibiofibular syndesmosis  disruption with syndesmosis screw. SURGEON:  MD Gabe Osuna, surgical assistant. ANESTHESIA:  General anesthesia. ESTIMATED BLOOD LOSS:  Minimal.    COMPLICATIONS:  None. TOURNIQUET:  Right upper calf, 250 mmHg. IMPLANTS USED:  1. Marlen distal fibula 5-hole locking plate with one 2.7 lag screw. 2.  Marlen 3.5 cortical screw x2 for syndesmosis repair. INDICATIONS:  This is an 60-year-old white male who sustained a fall  with right ankle injury. He was seen at [facility] where  he was found to have a displaced bimalleolar fracture. I was consulted  for his injury. All risks, benefits, and alternatives were discussed  with the patient. He agreed to proceed with surgical treatment. DESCRIPTION OF PROCEDURE:  The patient's right ankle was marked. He  received 2 gm Ancef IV preoperatively. The patient was then brought to  the operating room and underwent general anesthesia.   A well-padded  tourniquet was placed in the right upper calf. The right lower  extremity was then prepped and draped in a regular sterile routine  fashion. A time-out was called confirming the patient name, site, and  procedure. Esmarch was used for exsanguination and tourniquet was inflated to 250  mmHg. A lateral incision was made over the distal fibula. The fracture  was then exposed and found to be markedly displaced. We were able to  clean up the fracture site and able to reduce it anatomically. While  maintaining the reduction, we put a lag screw, a 2.7 lag screw from  anterior to posterior. This stabilized the fracture provisionally. At  this point, we elected to use Marlen distal fibula locking plate with  5-hole which will bypass the fracture. We then put one screw proximally  and then we locked it with a total of five distal 2.7 locking screws. We added one more cortical screw in the shaft. At this point, we kept  the fracture anatomically reduced and while maintaining the reduction,  we reduced the syndesmosis in place and we placed two syndesmosis screws  and those were 3.5 cortical screws. Overall, we were very satisfied  with the anatomic reduction and position of all the screws. At this  point, we let the tourniquet down and hemostasis was secured. We  irrigated the incision copiously with normal saline mixed with  gentamicin. We closed the deep layer with a 2-0 Vicryl, subcu with a  3-0 Vicryl and skin with a 4-0 Monocryl. Steri-Strips were then  applied. Dressing was then applied in the form of Xeroform, 4 x 4,  sterile Webril, and a splint was applied. The patient tolerated the procedure well and was taken to the recovery  in stable condition. POSTOPERATIVE PLAN:  The patient will be discharged home. He will be  nonweightbearing for two weeks. After that, we can start toe touch  weightbearing in a boot for the following four to six weeks.   He may  need staged procedure with syndesmosis screw removal five to six months  postoperatively.         Garth Stoddard MD    D: 02/26/2022 13:25:18       T: 02/26/2022 13:47:23     [id number]  Job#: [id number]     Doc#: [id number]    CC:  Nilo Gillis MD

## 2022-02-26 NOTE — CONSULTS
[facility] Orthopedic Surgery  Consult Note         This patient is seen in consultation at the request of AUSTIN Culver CNP    Reason for Consult:  Right ankle pain / medial & lateral malleolus displaced fracture. CHIEF COMPLAINT:  Right ankle pain / fall. History Obtained From:  patient, electronic medical record    HISTORY OF PRESENT ILLNESS:    Mr. Malia Krishnamurthy 80 y.o.  male seen today for consultation and evaluation of a right ankle injury which occurred when he was walking and fell on 2/23/2022. He was first seen and evaluated in [facility] ED came via EMS, where he was x-rayed, splinted and I was consulted. He is complaining of medial & lateral ankle pain and swelling. This is better with elevation and worse with bearing any wt. The pain is sharp and not radiating. No numbness or tingling sensation. Alleviating factors: elevation and rest. No other complaint. Past Medical History:        Diagnosis Date    Arthritis     Cancer [facility])     prostate    Diabetes mellitus ([facility] [address]     type 2    Hard of hearing     wears chidi aids    Hypertension     Obstructive sleep apnea syndrome 1/3/2017       Past Surgical History:        Procedure Laterality Date    KNEE ARTHROPLASTY Right     KNEE ARTHROSCOPY      right x1, left x2    KNEE SURGERY  6-20-13    RIGHT KNEE IRRIGATION AND DEBRIDEMENT KNEE WOUND    OTHER SURGICAL HISTORY      lt ring finger    PROSTATECTOMY      TONSILLECTOMY      UPPER GASTROINTESTINAL ENDOSCOPY  3/18/16    bleeding ulcer, clipped, epi and ablated       Medications prior to admission:   Prior to Admission medications    Medication Sig Start Date End Date Taking? Authorizing Provider   metformin (GLUCOPHAGE) 500 MG tablet Take 500 mg by mouth 2 times daily (with meals). Yes Historical Provider, MD   Cholecalciferol (VITAMIN D3) 2000 UNITS CAPS Take  by mouth daily. Yes Historical Provider, MD   Omega-3 Fatty Acids (FISH OIL) 600 MG CAPS Take  by mouth daily.    Yes Historical Provider, MD   pantoprazole (PROTONIX) 40 MG tablet 1 tab po two times a day for 1 month and then 1 time a day 3/19/16   Sue Jimena, MD   quinapril (ACCUPRIL) 40 MG tablet Take 40 mg by mouth daily. Historical Provider, MD   hydrochlorothiazide (HYDRODIURIL) 25 MG tablet Take 25 mg by mouth daily. Historical Provider, MD   MULTIPLE VITAMIN PO Take  by mouth daily.     Historical Provider, MD       Current Medications:   Current Facility-Administered Medications: hydroCHLOROthiazide (HYDRODIURIL) tablet 25 mg, 25 mg, Oral, Daily  pantoprazole (PROTONIX) tablet 40 mg, 40 mg, Oral, QAM AC  lisinopril (PRINIVIL;ZESTRIL) tablet 20 mg, 20 mg, Oral, Daily  insulin lispro (1 Unit Dial) 0-6 Units, 0-6 Units, SubCUTAneous, TID WC  insulin lispro (1 Unit Dial) 0-3 Units, 0-3 Units, SubCUTAneous, Nightly  glucose (GLUTOSE) 40 % oral gel 15 g, 15 g, Oral, PRN  dextrose 10 % infusion, 12.5 g, IntraVENous, PRN  glucagon (rDNA) injection 1 mg, 1 mg, IntraMUSCular, PRN  dextrose 5 % solution, 100 mL/hr, IntraVENous, PRN  sodium chloride flush 0.9 % injection 5-40 mL, 5-40 mL, IntraVENous, 2 times per day  sodium chloride flush 0.9 % injection 10 mL, 10 mL, IntraVENous, PRN  0.9 % sodium chloride infusion, 25 mL, IntraVENous, PRN  enoxaparin (LOVENOX) injection 40 mg, 40 mg, SubCUTAneous, Daily  ondansetron (ZOFRAN-ODT) disintegrating tablet 4 mg, 4 mg, Oral, Q8H PRN **OR** ondansetron (ZOFRAN) injection 4 mg, 4 mg, IntraVENous, Q6H PRN  polyethylene glycol (GLYCOLAX) packet 17 g, 17 g, Oral, Daily PRN  acetaminophen (TYLENOL) tablet 650 mg, 650 mg, Oral, Q6H PRN **OR** acetaminophen (TYLENOL) suppository 650 mg, 650 mg, Rectal, Q6H PRN  oxyCODONE (ROXICODONE) immediate release tablet 5 mg, 5 mg, Oral, Q4H PRN  morphine (PF) injection 2 mg, 2 mg, IntraVENous, Q4H PRN  0.9 % sodium chloride infusion, , IntraVENous, Continuous  melatonin tablet 3 mg, 3 mg, Oral, Nightly PRN    Allergies:  Patient has no known allergies. Social History     Socioeconomic History    Marital status:      Spouse name: Not on file    Number of children: Not on file    Years of education: Not on file    Highest education level: Not on file   Occupational History    Not on file   Tobacco Use    Smoking status: Never Smoker    Smokeless tobacco: Never Used   Vaping Use    Vaping Use: Never used   Substance and Sexual Activity    Alcohol use: Yes     Alcohol/week: 1.0 standard drink     Types: 1 Shots of liquor per week    Drug use: Not on file    Sexual activity: Not on file   Other Topics Concern    Not on file   Social History Narrative    Not on file     Social Determinants of Health     Financial Resource Strain:     Difficulty of Paying Living Expenses: Not on file   Food Insecurity:     Worried About Running Out of Food in the Last Year: Not on file    Juan of Food in the Last Year: Not on file   Transportation Needs:     Lack of Transportation (Medical): Not on file    Lack of Transportation (Non-Medical):  Not on file   Physical Activity:     Days of Exercise per Week: Not on file    Minutes of Exercise per Session: Not on file   Stress:     Feeling of Stress : Not on file   Social Connections:     Frequency of Communication with Friends and Family: Not on file    Frequency of Social Gatherings with Friends and Family: Not on file    Attends Quaker Services: Not on file    Active Member of [address] [place] [address] or Organizations: Not on file    Attends Club or Organization Meetings: Not on file    Marital Status: Not on file   Intimate Partner Violence:     Fear of Current or Ex-Partner: Not on file    Emotionally Abused: Not on file    Physically Abused: Not on file    Sexually Abused: Not on file   Housing Stability:     Unable to Pay for Housing in the Last Year: Not on file    Number of [place] in the Last Year: Not on file    Unstable Housing in the Last Year: Not on file       Family History:  Family History   Problem Relation Age of Onset    High Blood Pressure Mother     Heart Disease Father          REVIEW OF SYSTEMS:   CONSTITUTIONAL: Denies unexplained weight loss, fevers, chills or fatigue  NEUROLOGICAL: Denies unsteady gait or progressive weakness    PSYCHOLOGICAL: Denies anxiety, depression   SKIN: Denies skin changes, delayed healing, rash, itching   HEMATOLOGIC: Denies easy bleeding or bruising  ENDOCRINE: Denies excessive thirst, urination, heat/cold  RESPIRATORY: Denies current dyspnea, cough  CARDIOVASCULAR: Negative for chest pain at this time. EYES: Negative for photophobia and visual disturbance. ENT:  Negative for rhinorrhea, epistaxis, sore throat, or hearing loss. GI: Denies nausea, vomiting, diarrhea   : Denies bowel or bladder issues   MUSCULOSKELETAL: Right ankle pain. All other ROS reviewed in chart or with patient or family and are grossly negative. PHYSICAL EXAMINATION:  Mr. Quincy Mayo is a very pleasant 80 y.o. male who seen today in no acute distress, awake, alert, and oriented. He is well nourished and groomed. Patient with normal affect. Body mass index is 24.39 kg/m². . Skin warm and dry. Resting respiratory rate is 16. Resp deep and easy. Pulse is with regular rate and rhythm    /75   Pulse 78   Temp 97.2 °F (36.2 °C) (Oral)   Resp 16   Ht 5' 10\" (1.778 m)   Wt 170 lb (77.1 kg)   SpO2 95%   BMI 24.39 kg/m²        Airway is intact  Chest: chest clear, no wheezing, rales, normal symmetric air entry, no tachypnea, retractions or cyanosis  Heart: regular rate and rhythm ; heart sounds normal   Hearing intact, pupil equal and reactive bilateral  Lymphatics; No groin or axillary enlarged lymph nodes. Neck; No swelling  Abdomen; soft, non distended. MUSCULOSKELETAL:   Examination of both ankles showing a decreased range of motion of the right ankle compare to the other side.   There is moderate swelling that can be seen, as well as ecchymosis. He has intact sensation and good pedal pulses. He has significant tenderness on deep palpation over the medial & lateral malleolus of the right ankle. NEUROLOGIC:   Sensory:    Touch:                     Right Upper Extremity:  normal                   Left Upper Extremity:  normal                  Right Lower Extremity:  normal                  Left Lower Extremity:  normal        DATA:    CBC:   Lab Results   Component Value Date    WBC 4.5 02/25/2022    RBC 3.94 02/25/2022    HGB 13.2 02/25/2022    HCT 39.7 02/25/2022    .6 02/25/2022    MCH 33.6 02/25/2022    MCHC 33.4 02/25/2022    RDW 14.1 02/25/2022     02/25/2022    MPV 8.3 02/25/2022     WBC:    Lab Results   Component Value Date    WBC 4.5 02/25/2022     PT/INR:    Lab Results   Component Value Date    PROTIME 12.3 03/17/2016    INR 1.08 03/17/2016     PTT:    Lab Results   Component Value Date    APTT 28.0 03/17/2016   [APTT    IMAGING: Xrays, 3 [facility] right [facility]  were reviewed, and showed a moderately displaced lateral malleolus fracture. IMPRESSION: Right ankle pain / medial & lateral malleolus displaced fracture. PLAN:  I discussed with Marck Chiang the overall alignment of the fracture and treatment options including both surgical and non-surgical treatment, and that my recommendation is an open reduction and internal fixation given the amount of displacement and comminution of the fracture. I discussed the risks and benefits of surgery with the patient, including but not limited to infection, bleeding, pain, injury to nerves or blood vessels failure of the surgery and need for additional surgery. All the patient's questions were answered. We discussed an expected post-operative course. He  is understanding of this and wishes to proceed.      Surgery tomorrow 8:00 AM       Angus Cornejo MD   2/25/2022  7:30 PM

## 2022-02-26 NOTE — ANESTHESIA PRE PROCEDURE
Department of Anesthesiology  Preprocedure Note       Name:  Juliette Boast   Age:  80 y.o.  :  1934                                          MRN:  [id number]         Date:  2022      Surgeon: Carlos Gomez):  Ivette Red MD    Procedure: Procedure(s):  OPEN REDUCTION INTERNAL FIXATION OF RIGHT BIMALLEOLAR ANKLE FRACTURE (Cornelious Bookman)    Medications prior to admission:   Prior to Admission medications    Medication Sig Start Date End Date Taking? Authorizing Provider   pantoprazole (PROTONIX) 40 MG tablet 1 tab po two times a day for 1 month and then 1 time a day 3/19/16   John Loen MD   metformin (GLUCOPHAGE) 500 MG tablet Take 500 mg by mouth 2 times daily (with meals). Historical Provider, MD   quinapril (ACCUPRIL) 40 MG tablet Take 40 mg by mouth daily. Historical Provider, MD   hydrochlorothiazide (HYDRODIURIL) 25 MG tablet Take 25 mg by mouth daily. Historical Provider, MD   Cholecalciferol (VITAMIN D3) 2000 UNITS CAPS Take  by mouth daily. Historical Provider, MD   MULTIPLE VITAMIN PO Take  by mouth daily. Historical Provider, MD   Omega-3 Fatty Acids (FISH OIL) 600 MG CAPS Take  by mouth daily. Historical Provider, MD       Current medications:    No current facility-administered medications for this visit. No current outpatient medications on file.      Facility-Administered Medications Ordered in Other Visits   Medication Dose Route Frequency Provider Last Rate Last Admin    sodium chloride flush 0.9 % injection 5-40 mL  5-40 mL IntraVENous 2 times per day Clark Caro MD        sodium chloride flush 0.9 % injection 5-40 mL  5-40 mL IntraVENous PRN Clark Caro MD        0.9 % sodium chloride infusion  25 mL IntraVENous PRN Clark Caro MD        acetaminophen (TYLENOL) tablet 650 mg  650 mg Oral Once PRN Clark Caro MD        fentaNYL (SUBLIMAZE) injection 25 mcg  25 mcg IntraVENous Q5 Min PRN Jessica Ruvalcaba Monique Donnelly MD        HYDROmorphone (DILAUDID) injection 0.25 mg  0.25 mg IntraVENous Q5 Min PRN Pushpa Pardo MD        oxyCODONE (ROXICODONE) immediate release tablet 5 mg  5 mg Oral Once PRN Pushpa Pardo MD        ondansetron [facility]) injection 4 mg  4 mg IntraVENous Once PRN Pushpa Pardo MD        prochlorperazine (COMPAZINE) injection 5 mg  5 mg IntraVENous Once PRN Pushpa Pardo MD        labetalol (NORMODYNE;TRANDATE) injection 10 mg  10 mg IntraVENous Q15 Min PRN Pushap Pardo MD        Or   Matias Arzola hydrALAZINE (APRESOLINE) injection 10 mg  10 mg IntraVENous Q15 Min PRN Pushpa Pardo MD        hydroCHLOROthiazide (HYDRODIURIL) tablet 25 mg  25 mg Oral Daily Steven Gonzalez MD   25 mg at 02/25/22 0800    pantoprazole (PROTONIX) tablet 40 mg  40 mg Oral QAM AC Steven Gonzalez MD   40 mg at 02/26/22 0615    lisinopril (PRINIVIL;ZESTRIL) tablet 20 mg  20 mg Oral Daily Steven Gonzalez MD   20 mg at 02/25/22 0800    insulin lispro (1 Unit Dial) 0-6 Units  0-6 Units SubCUTAneous TID WC Eleno Hardin MD        insulin lispro (1 Unit Dial) 0-3 Units  0-3 Units SubCUTAneous Nightly Eleno Hardin MD        glucose (GLUTOSE) 40 % oral gel 15 g  15 g Oral PRN Steven Gonzalez MD        dextrose 10 % infusion  12.5 g IntraVENous PRN Steven Gonzalez MD        glucagon (rDNA) injection 1 mg  1 mg IntraMUSCular PRN Steven Gonzalez MD        dextrose 5 % solution  100 mL/hr IntraVENous PRN Steven Gonzalez MD        sodium chloride flush 0.9 % injection 5-40 mL  5-40 mL IntraVENous 2 times per day Steven Gonzalez MD        sodium chloride flush 0.9 % injection 10 mL  10 mL IntraVENous PRN Steven Gonzalez MD        0.9 % sodium chloride infusion  25 mL IntraVENous PRN Steven Gonzalez MD        enoxaparin (LOVENOX) injection 40 mg  40 mg SubCUTAneous Daily Steven Gonzalez MD        ondansetron (ZOFRAN-ODT) disintegrating tablet 4 mg  4 mg Oral Q8H PRN Hai Xiao MD        Or    ondansetron (ZOFRAN) injection 4 mg  4 mg IntraVENous Q6H PRN Hai Xiao MD        polyethylene glycol (GLYCOLAX) packet 17 g  17 g Oral Daily PRN Hai Xiao MD        acetaminophen (TYLENOL) tablet 650 mg  650 mg Oral Q6H PRN Hai Xiao MD   650 mg at 02/25/22 2017    Or    acetaminophen (TYLENOL) suppository 650 mg  650 mg Rectal Q6H PRN Hai Xiao MD        oxyCODONE (ROXICODONE) immediate release tablet 5 mg  5 mg Oral Q4H PRN Hai Xiao MD        morphine (PF) injection 2 mg  2 mg IntraVENous Q4H PRN Hai Xiao MD        melatonin tablet 3 mg  3 mg Oral Nightly PRN AUSTIN Arango - CNP   3 mg at 02/25/22 2017       Allergies:  No Known Allergies    Problem List:    Patient Active Problem List   Diagnosis Code    S/P knee replacement Z96.659    HTN (hypertension) I10    Diabetes mellitus ([facility] [address] E11.9    Hip pain M25.559    Lumbar and sacral osteoarthritis 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 8XXA    Closed bimalleolar fracture with fracture of distal fibula S82.843A, Y10.322O       Past Medical History:        Diagnosis Date    Arthritis     Cancer ([facility] [address]     prostate    Diabetes mellitus ([facility] [address]     type 2    Hard of hearing     wears chidi aids    Hypertension     Obstructive sleep apnea syndrome 1/3/2017       Past Surgical History:        Procedure Laterality Date    KNEE ARTHROPLASTY Right     KNEE ARTHROSCOPY      right x1, left x2    KNEE SURGERY  6-20-13    RIGHT KNEE IRRIGATION AND DEBRIDEMENT KNEE WOUND    OTHER SURGICAL HISTORY      lt ring finger    PROSTATECTOMY      TONSILLECTOMY      UPPER GASTROINTESTINAL ENDOSCOPY  3/18/16    bleeding ulcer, clipped, epi and ablated       Social History: Social History     Tobacco Use    Smoking status: Never Smoker    Smokeless tobacco: Never Used   Substance Use Topics    Alcohol use: Yes     Alcohol/week: 1.0 standard drink     Types: 1 Shots of liquor per week                                Counseling given: Not Answered      Vital Signs (Current): There were no vitals filed for this visit.                                            BP Readings from Last 3 Encounters:   02/26/22 126/73   11/01/18 (!) 150/91   06/22/18 118/68       NPO Status:                                                                                 BMI:   Wt Readings from Last 3 Encounters:   02/24/22 170 lb (77.1 kg)   06/22/18 181 lb (82.1 kg)   06/19/17 181 lb (82.1 kg)     There is no height or weight on file to calculate BMI.    CBC:   Lab Results   Component Value Date    WBC 4.8 02/26/2022    RBC 3.70 02/26/2022    HGB 12.5 02/26/2022    HCT 37.0 02/26/2022    .2 02/26/2022    RDW 14.4 02/26/2022     02/26/2022       CMP:   Lab Results   Component Value Date     02/26/2022    K 3.9 02/26/2022     02/26/2022    CO2 25 02/26/2022    BUN 28 02/26/2022    CREATININE 1.1 02/26/2022    GFRAA >60 02/26/2022    GFRAA >60 06/11/2013    AGRATIO 1.4 02/26/2022    LABGLOM >60 02/26/2022    GLUCOSE 94 02/26/2022    PROT 5.3 02/26/2022    CALCIUM 8.9 02/26/2022    BILITOT 0.4 02/26/2022    ALKPHOS 69 02/26/2022    AST 23 02/26/2022    ALT 17 02/26/2022       POC Tests:   Recent Labs     02/26/22  0723   POCGLU 86       Coags:   Lab Results   Component Value Date    PROTIME 12.3 03/17/2016    INR 1.08 03/17/2016    APTT 28.0 03/17/2016       HCG (If Applicable): No results found for: PREGTESTUR, PREGSERUM, HCG, HCGQUANT     ABGs: No results found for: PHART, PO2ART, PGQ5OZU, NTT1VGJ, BEART, F7LAHGJY     Type & Screen (If Applicable):  Lab Results   Component Value Date    LABABO O 05/30/2013    LABRH Positive 05/30/2013       Drug/Infectious Status (If Applicable):  No results found for: HIV, HEPCAB    COVID-19 Screening (If Applicable):   Lab Results   Component Value Date    COVID19 Not Detected 02/25/2022           Anesthesia Evaluation  Patient summary reviewed and Nursing notes reviewed no history of anesthetic complications:   Airway: Mallampati: II  TM distance: >3 FB   Neck ROM: full  Mouth opening: > = 3 FB Dental: normal exam         Pulmonary:   (+) sleep apnea:      (-) asthma and shortness of breath                           Cardiovascular:    (+) hypertension:,     (-)  angina    ECG reviewed                        Neuro/Psych:      (-) CVA           GI/Hepatic/Renal:        (-) GERD and liver disease       Endo/Other:    (+) DiabetesType II DM, , : arthritis:., .    (-) hypothyroidism               Abdominal:             Vascular:     - PVD. Other Findings:               Anesthesia Plan      general     ASA 2       Induction: intravenous. MIPS: Postoperative opioids intended and Prophylactic antiemetics administered. Anesthetic plan and risks discussed with patient. Plan discussed with CRNA.                   Reyna Choi MD   2/26/2022

## 2022-02-26 NOTE — PROGRESS NOTES
Shift assessment completed, patient is alert and oriented X4, hard of hearing. VSS, patient is NPO after midnight, surgery this morning. The care plan and education has been reviewed and mutually agreed upon with the patient.

## 2022-02-26 NOTE — PROGRESS NOTES
[facility]    2/26/2022 1:47 PM        Name: Loren Purcell . Admitted: 2/24/2022  Primary Care Provider: Elsa Christensen MD (Tel: [phone])      Chief Complaint   Patient presents with    Ankle Pain     pt fell yesterday, got xrays today w/ an acute oblique displaced fracture. good pedal pulses denies hitting head , no other c/o. pt able to stand and pivot for ems. ABCs intact gcs 15 pain 2/10     Brief History: Patient is an 79 yo male with hx arthritis, prostate cancer, DM2, HTN, CLARITZA. He lives with wife in assisted living, wife currently out of state visiting daughter. Patient presented to ER with pain right ankle after mechanical fall at home on 2/23, \"I lost my balance. \" Found to have fracture distal right fibula, splint applied in ER. Has been seen by orthopedics and ORIF scheduled for 8am tomorrow. 2/26/2022: OPEN REDUCTION INTERNAL FIXATION OF RIGHT BIMALLEOLAR ANKLE FRACTURE (Luca Flint), SYNDESMOSIS REPAIR.       Subjective:  S/p ORIF right ankle this am. Resting in bed, awake and alert. States he feels pretty good, wants to know when he can go home. Reports mild operative pain but says it is tolerable. Denies chest pain, shortness of breath, palpitations, abdominal pain, nausea.      Reviewed interval ancillary notes    Current Medications  0.45 % sodium chloride infusion, Continuous  sodium chloride flush 0.9 % injection 5-40 mL, 2 times per day  sodium chloride flush 0.9 % injection 5-40 mL, PRN  0.9 % sodium chloride infusion, PRN  ceFAZolin (ANCEF) 2000 mg in dextrose 5 % 50 mL IVPB, Q8H  aspirin EC tablet 325 mg, BID  hydroCHLOROthiazide (HYDRODIURIL) tablet 25 mg, Daily  pantoprazole (PROTONIX) tablet 40 mg, QAM AC  lisinopril (PRINIVIL;ZESTRIL) tablet 20 mg, Daily  insulin lispro (1 Unit Dial) 0-6 Units, TID WC  insulin lispro (1 Unit Dial) 0-3 Units, Nightly  glucose 1.0 1.1 1.1   GLUCOSE 108* 97 94     Hepatic:   Recent Labs     02/25/22  0627 02/26/22  0602   AST 25 23   ALT 15 17   BILITOT 0.3 0.4   ALKPHOS 85 69       Xray Right Ankle 2/25/2022:  1. Acute fracture of the distal right fibula as above remaining in continuity   with the right talus, which appears to be minimally subluxed laterally. There is no evident involvement of the ankle mortise. 2. Suspected acute avulsion fractures from the right medial malleolus and/or   talar body with overlying soft tissue swelling. Problem List  Principal Problem:    Fracture  Active Problems:    Closed bimalleolar fracture with fracture of distal fibula    Syndesmotic disruption of right ankle  Resolved Problems:    * No resolved hospital problems. *       Assessment & Plan:   1. Right ankle fracture. Secondary mechanical fall at home. S/p ORIF right ankle fracture 2/26. Splint in place. NWB x 2 weeks. Management per surgery. PT/OT consults pending. CBC in am.   2. DM2. Takes metformin at home, held on admission. Being covered with low dose correction. BG values controlled. 3. HTN. Controlled. Continue lisinopril and HCTZ. 4. CLARITZA, untreated. Diet: ADULT DIET;  Regular  Code:Full Code  DVT PPX: enoxaparin      AUSTIN Carmona - CNP   2/26/2022 1:47 PM

## 2022-02-26 NOTE — BRIEF OP NOTE
Brief Postoperative Note      Patient: Joana Dance  YOB: 1934  MRN: [id number]    Date of Procedure: 2/26/2022    Pre-Op Diagnosis: Right Ankle Bimalleolar Fracture with syndesmosis disruption. Post-Op Diagnosis: Same       Procedure(s):  OPEN REDUCTION INTERNAL FIXATION OF RIGHT BIMALLEOLAR ANKLE FRACTURE (Ang Range), SYNDESMOSIS REPAIR. Surgeon(s):  Martha Busch MD    Assistant:  Surgical Assistant: Fabián Jenkins RN    Anesthesia: General    Estimated Blood Loss (mL): Minimal    Complications: None    Specimens:   * No specimens in log *    Implants:  Implant Name Type Inv. Item Serial No.  Lot No. LRB No. Used Action   PLATE BNE [id number] 4 H R LAT DST PERIARTC FIBULAR S STL KAVYA - [id number]  PLATE BNE [id number] 4 H R LAT DST PERIARTC FIBULAR S STL KAVYA  [employer]WD  Right 1 Implanted   SCREW BNE L16MM DIA2. 7MM LNG LANDON FT ANK S STL ST - [id number]  SCREW BNE L16MM DIA2. 7MM LNG LANDON FT ANK S STL ST  [employer]WD  Right 1 Implanted   SCREW BNE L14MM DIA3. 5MM HD DIA2.7MM LANDON PERIARTC S STL ST - [id number]  SCREW BNE L14MM DIA3. 5MM HD DIA2.7MM LANDON PERIARTC S STL ST  [employer]WD  Right 1 Implanted   SCREW BNE L16MM DIA3. 5MM HD DIA2.7MM PERIARTC LANDON S STL ST - [id number]  SCREW BNE L16MM DIA3. 5MM HD DIA2.7MM PERIARTC LANDON S STL ST  [employer]WD  Right 1 Implanted   SCREW BNE L60MM DIA3. 5MM HD DIA2.7MM PERIARTC LANDON S STL ST - [id number]  SCREW BNE L60MM DIA3. 5MM HD DIA2.7MM PERIARTC LANDON S STL ST  [employer]WD  Right 1 Implanted   SCREW L65MM DIA3. 5MM HD 2.7MM PERIARTC ST - [id number]  SCREW L65MM DIA3. 5MM HD 2.7MM PERIARTC ST  [employer]WD  Right 1 Implanted   SCREW BNE L16MM DIA2.7MM HEX HD DIA2.5MM CANC BIODUR 108C - [id number]  SCREW BNE L16MM DIA2.7MM HEX HD DIA2.5MM CAN BIODUR 108C  [employer]WD  Right 1 Implanted   SCREW BNE L18MM DIA2.7MM HEX HD DIA2.5MM CAN BIODUR 108C - [id number]  SCREW BNE L18MM DIA2.7MM HEX HD DIA2.5MM CANC BIODUR 108C  [employer]WD  Right 1 Implanted   SCREW BNE L20MM DIA2.7MM HEX HD DIA2.5MM CANC BIODUR 108C - [id number]  SCREW BNE L20MM DIA2.7MM HEX HD DIA2.5MM CANC BIODUR 108C  [employer]WD  Right 3 Implanted         Drains: * No LDAs found *    Findings: Same    Electronically signed by Magy Arthur MD on 2/26/2022 at 9:42 AM

## 2022-02-27 LAB
A/G RATIO: 1.7 (ref 1.1–2.2)
ALBUMIN SERPL-MCNC: 3.7 G/DL (ref 3.4–5)
ALP BLD-CCNC: 78 U/L (ref 40–129)
ALT SERPL-CCNC: 15 U/L (ref 10–40)
ANION GAP SERPL CALCULATED.3IONS-SCNC: 10 MMOL/L (ref 3–16)
AST SERPL-CCNC: 22 U/L (ref 15–37)
BASOPHILS ABSOLUTE: 0 K/UL (ref 0–0.2)
BASOPHILS RELATIVE PERCENT: 0.4 %
BILIRUB SERPL-MCNC: 0.4 MG/DL (ref 0–1)
BUN BLDV-MCNC: 18 MG/DL (ref 7–20)
CALCIUM SERPL-MCNC: 9.4 MG/DL (ref 8.3–10.6)
CHLORIDE BLD-SCNC: 105 MMOL/L (ref 99–110)
CO2: 24 MMOL/L (ref 21–32)
CREAT SERPL-MCNC: 1.2 MG/DL (ref 0.8–1.3)
EOSINOPHILS ABSOLUTE: 0.3 K/UL (ref 0–0.6)
EOSINOPHILS RELATIVE PERCENT: 3.6 %
GFR AFRICAN AMERICAN: >60
GFR NON-AFRICAN AMERICAN: 57
GLUCOSE BLD-MCNC: 104 MG/DL (ref 70–99)
GLUCOSE BLD-MCNC: 112 MG/DL (ref 70–99)
GLUCOSE BLD-MCNC: 113 MG/DL (ref 70–99)
GLUCOSE BLD-MCNC: 138 MG/DL (ref 70–99)
GLUCOSE BLD-MCNC: 99 MG/DL (ref 70–99)
HCT VFR BLD CALC: 38.4 % (ref 40.5–52.5)
HEMOGLOBIN: 13 G/DL (ref 13.5–17.5)
LYMPHOCYTES ABSOLUTE: 1.4 K/UL (ref 1–5.1)
LYMPHOCYTES RELATIVE PERCENT: 19.6 %
MCH RBC QN AUTO: 34.1 PG (ref 26–34)
MCHC RBC AUTO-ENTMCNC: 33.9 G/DL (ref 31–36)
MCV RBC AUTO: 100.5 FL (ref 80–100)
MONOCYTES ABSOLUTE: 0.5 K/UL (ref 0–1.3)
MONOCYTES RELATIVE PERCENT: 6.3 %
NEUTROPHILS ABSOLUTE: 5.1 K/UL (ref 1.7–7.7)
NEUTROPHILS RELATIVE PERCENT: 70.1 %
PDW BLD-RTO: 14 % (ref 12.4–15.4)
PERFORMED ON: ABNORMAL
PERFORMED ON: NORMAL
PLATELET # BLD: 196 K/UL (ref 135–450)
PMV BLD AUTO: 8.1 FL (ref 5–10.5)
POTASSIUM REFLEX MAGNESIUM: 3.9 MMOL/L (ref 3.5–5.1)
RBC # BLD: 3.82 M/UL (ref 4.2–5.9)
SODIUM BLD-SCNC: 139 MMOL/L (ref 136–145)
TOTAL PROTEIN: 5.9 G/DL (ref 6.4–8.2)
WBC # BLD: 7.3 K/UL (ref 4–11)

## 2022-02-27 PROCEDURE — 97161 PT EVAL LOW COMPLEX 20 MIN: CPT

## 2022-02-27 PROCEDURE — 6360000002 HC RX W HCPCS: Performed by: ORTHOPAEDIC SURGERY

## 2022-02-27 PROCEDURE — 1200000000 HC SEMI PRIVATE

## 2022-02-27 PROCEDURE — 97530 THERAPEUTIC ACTIVITIES: CPT

## 2022-02-27 PROCEDURE — 6370000000 HC RX 637 (ALT 250 FOR IP): Performed by: ORTHOPAEDIC SURGERY

## 2022-02-27 PROCEDURE — 2580000003 HC RX 258: Performed by: ORTHOPAEDIC SURGERY

## 2022-02-27 PROCEDURE — 36415 COLL VENOUS BLD VENIPUNCTURE: CPT

## 2022-02-27 PROCEDURE — 80053 COMPREHEN METABOLIC PANEL: CPT

## 2022-02-27 PROCEDURE — 97166 OT EVAL MOD COMPLEX 45 MIN: CPT

## 2022-02-27 PROCEDURE — 85025 COMPLETE CBC W/AUTO DIFF WBC: CPT

## 2022-02-27 RX ADMIN — CEFAZOLIN SODIUM 2000 MG: 10 INJECTION, POWDER, FOR SOLUTION INTRAVENOUS at 01:27

## 2022-02-27 RX ADMIN — PANTOPRAZOLE SODIUM 40 MG: 40 TABLET, DELAYED RELEASE ORAL at 05:46

## 2022-02-27 RX ADMIN — SODIUM CHLORIDE: 4.5 INJECTION, SOLUTION INTRAVENOUS at 01:25

## 2022-02-27 RX ADMIN — ASPIRIN 325 MG: 325 TABLET, COATED ORAL at 21:02

## 2022-02-27 RX ADMIN — HYDROCHLOROTHIAZIDE 25 MG: 25 TABLET ORAL at 08:55

## 2022-02-27 RX ADMIN — MELATONIN TAB 3 MG 3 MG: 3 TAB at 21:02

## 2022-02-27 RX ADMIN — OXYCODONE 5 MG: 5 TABLET ORAL at 05:53

## 2022-02-27 RX ADMIN — ENOXAPARIN SODIUM 40 MG: 40 INJECTION SUBCUTANEOUS at 08:55

## 2022-02-27 RX ADMIN — Medication 10 ML: at 08:42

## 2022-02-27 RX ADMIN — LISINOPRIL 20 MG: 20 TABLET ORAL at 08:55

## 2022-02-27 RX ADMIN — ASPIRIN 325 MG: 325 TABLET, COATED ORAL at 08:55

## 2022-02-27 RX ADMIN — Medication 10 ML: at 08:41

## 2022-02-27 ASSESSMENT — PAIN SCALES - GENERAL
PAINLEVEL_OUTOF10: 6
PAINLEVEL_OUTOF10: 5
PAINLEVEL_OUTOF10: 5
PAINLEVEL_OUTOF10: 0

## 2022-02-27 ASSESSMENT — PAIN DESCRIPTION - LOCATION
LOCATION: ANKLE
LOCATION: ANKLE

## 2022-02-27 ASSESSMENT — PAIN DESCRIPTION - FREQUENCY: FREQUENCY: CONTINUOUS

## 2022-02-27 ASSESSMENT — PAIN DESCRIPTION - ONSET: ONSET: ON-GOING

## 2022-02-27 ASSESSMENT — PAIN DESCRIPTION - PAIN TYPE
TYPE: SURGICAL PAIN
TYPE: SURGICAL PAIN

## 2022-02-27 ASSESSMENT — PAIN DESCRIPTION - ORIENTATION
ORIENTATION: RIGHT
ORIENTATION: RIGHT

## 2022-02-27 ASSESSMENT — PAIN DESCRIPTION - PROGRESSION: CLINICAL_PROGRESSION: NOT CHANGED

## 2022-02-27 ASSESSMENT — PAIN DESCRIPTION - DESCRIPTORS: DESCRIPTORS: ACHING;DULL

## 2022-02-27 NOTE — PROGRESS NOTES
Occupational Therapy   Occupational Therapy Initial Assessment  Date: 2022   Patient Name: Almas Holden  MRN: [id number]     : 1934    Date of Service: 2022    Discharge Recommendations:    Almas Holden scored a 15/24 on the AM-PAC ADL Inpatient form. Current research shows that an AM-PAC score of 17 or less is typically not associated with a discharge to the patient's home setting. Based on the patient's AM-PAC score and their current ADL deficits, it is recommended that the patient have 3-5 sessions per week of Occupational Therapy at d/c to increase the patient's independence. Please see assessment section for further patient specific details. If patient discharges prior to next session this note will serve as a discharge summary. Please see below for the latest assessment towards goals. OT Equipment Recommendations  Equipment Needed: No  Other: defer to next level of care    Assessment   Performance deficits / Impairments: Decreased functional mobility ; Decreased ADL status; Decreased strength;Decreased safe awareness;Decreased cognition;Decreased endurance;Decreased balance  Assessment: Pt is currently functioning below occupational baseline and demo the deficits listed above.  Pt would benefit from continued skilled OT services to address these deficits and increase IND, safety, and ease with all occupational pursuits  Treatment Diagnosis: Decreased ADL status, functional mobility, and functional transfers d/t Fracture s/p OPEN REDUCTION INTERNAL FIXATION OF RIGHT BIMALLEOLAR ANKLE FRACTURE (Hira Gonsales), SYNDESMOSIS REPAIR   Prognosis: Good  Decision Making: Medium Complexity  OT Education: OT Role;Plan of Care;Transfer Training;Energy Conservation;Precautions  Patient Education: eval, d/c recommendations- pt verbalized understanding but would benefit from reinforcement  Barriers to Learning: cognition, hearing  REQUIRES OT FOLLOW UP: Yes  Activity Tolerance  Activity Tolerance: Patient Tolerated treatment well;Patient limited by fatigue  Safety Devices  Safety Devices in place: Yes  Type of devices: All fall risk precautions in place; Left in chair;Call light within reach;Nurse notified; Chair alarm in place  Restraints  Initially in place: No           Patient Diagnosis(es): The encounter diagnosis was Closed bimalleolar fracture with fracture of distal fibula. has a past medical history of Arthritis, Cancer ([address] Diabetes mellitus ([address] Hard of hearing, Hypertension, and Obstructive sleep apnea syndrome. has a past surgical history that includes Knee arthroscopy; Prostatectomy; other surgical history; Knee Arthroplasty (Right); knee surgery (6-20-13); Upper gastrointestinal endoscopy (3/18/16); and Tonsillectomy. Treatment Diagnosis: Decreased ADL status, functional mobility, and functional transfers d/t Fracture s/p OPEN REDUCTION INTERNAL FIXATION OF RIGHT BIMALLEOLAR ANKLE FRACTURE (Raymond Blank), SYNDESMOSIS REPAIR 2/26      Restrictions  Restrictions/Precautions  Restrictions/Precautions: Weight Bearing,Fall Risk (HIGH FALL RISK)  Lower Extremity Weight Bearing Restrictions  Right Lower Extremity Weight Bearing: Non Weight Bearing (R LE)  Position Activity Restriction  Other position/activity restrictions: \"Patient is an 81 yo male with hx arthritis, prostate cancer, DM2, HTN, CLARITZA. He lives with wife in assisted living, wife currently out of state visiting daughter. Patient presented to ER with pain right ankle after mechanical fall at home on 2/23, \"I lost my balance. \" Found to have fracture distal right fibula, splint applied in ER. Has been seen by orthopedics and ORIF scheduled for 8am tomorrow. \"    Subjective   General  Chart Reviewed: Yes  Patient assessed for rehabilitation services?: Yes  Additional Pertinent Hx: PMH: Arthritis, Cancer ([address] Diabetes mellitus ([address] Hard of hearing, Hypertension, and Obstructive sleep apnea syndrome (1/3/2017).   Family / Caregiver Present: No  Referring Practitioner: Niki Crespo MD  Diagnosis: Fracture s/p OPEN REDUCTION INTERNAL FIXATION OF RIGHT BIMALLEOLAR ANKLE FRACTURE (Fady Sea), SYNDESMOSIS REPAIR 2/26  Subjective  Subjective: Pt supine in bed upon arrival, very [place] but pleasant and agreeable to OT evaluation and treat  Patient Currently in Pain: Yes  Pain Assessment  Pain Assessment: 0-10  Pain Level: 5  Pain Type: Surgical pain  Pain Location: Ankle  Pain Orientation: Right  Pain Descriptors: Aching;Dull  Pain Frequency: Continuous  Pain Onset: On-going  Clinical Progression: Not changed  Non-Pharmaceutical Pain Intervention(s): Repositioned; Ambulation/Increased Activity; Emotional support  Pre Treatment Pain Screening  Intervention List: Patient able to continue with treatment  Vital Signs  Patient Currently in Pain: Yes    Social/Functional History  Social/Functional History  Lives With: Alone (cat)  Type of Home: Facility ([facility])  Home Layout: One level  Home Access: Level entry  Bathroom Shower/Tub: Shower chair with back,Walk-in shower  Bathroom Toilet: Standard  Bathroom Equipment: Grab bars in [facility] 40 wheeled walker  ADL Assistance: Independent  Homemaking Responsibilities: No  Ambulation Assistance: Independent (w/ use of 4WW)  Transfer Assistance: Independent  Active : No  Patient's  Info: medical transportation for appointments p  Additional Comments: Pt reports 1 recent fall which has lead to this admission.  Pt reports for the last 2-3 years pt       Objective   Vision: Impaired  Vision Exceptions: Wears glasses for distance (pt reports he only wears them when he watches TV)  Hearing: Exceptions to [facility]  Hearing Exceptions: Bilateral hearing aid    Orientation  Overall Orientation Status: Within Functional Limits  Observation/Palpation  Posture: Fair (rounded shoulders, slightly forward flexed)  Observation: R lower leg bandaged  Balance  Sitting Balance: Stand by assistance  Standing Balance: Minimal assistance  Standing Balance  Time: 4 minutes total  Activity: functional mobility (hopping forward/backwards), functional transfers  Comment: min(A) for hopping in stance d/t unsteadiness, max(A)/verbal cues needed to maintain WBing precautions  Functional Mobility  Functional - Mobility Device: Rolling Walker  Activity: Other  Assist Level: Dependent/Total  Functional Mobility Comments: min(A)x1 for balance + max(A)x1 to maintain NWBing precautions with use of RW to hop 4 steps forward/backward. ADL  Additional Comments: Pt declined ADL participation, however, anticipate increased difficulty with LB ADLs d/t NWBing status  Tone RUE  RUE Tone: Normotonic  Tone LUE  LUE Tone: Normotonic  Coordination  Movements Are Fluid And Coordinated: Yes  Transfers  Sit Pivot Transfers: Contact guard assistance;Minimal assistance (CGA from bed>chair, min(A) from chair>bed)  Sit to stand: Minimal assistance  Stand to sit: Minimal assistance  Transfer Comments: Pt completed squat pivots to<>from chair with CGA-MIN(A) to increased adherence to NWBing precautions with squat pivots. Verbal cues and physical assistance required throughout to maintain NWBing precautions  Cognition  Overall Cognitive Status: Exceptions  Arousal/Alertness: Appropriate responses to stimuli  Following Commands: Follows one step commands with repetition; Follows one step commands with increased time  Attention Span: Attends with cues to redirect  Safety Judgement: Decreased awareness of need for assistance;Decreased awareness of need for safety  Problem Solving: Decreased awareness of errors;Assistance required to identify errors made;Assistance required to generate solutions  Insights: Decreased awareness of deficits  Initiation: Requires cues for some  Sequencing: Requires cues for some  Cognition Comment: very [place], impulsive with mobility, reinforcement needed throughout to maintain NWBing precautions, however, pt able to remember and verbalize precautions throughout  Perception  Overall Perceptual Status: WFL  Sensation  Overall Sensation Status: WFL  LUE AROM (degrees)  LUE AROM : WFL  Left Hand AROM (degrees)  Left Hand AROM: WFL  RUE AROM (degrees)  RUE AROM : WFL  Right Hand AROM (degrees)  Right Hand AROM: WFL  LUE Strength  Gross LUE Strength: WFL  RUE Strength  Gross RUE Strength: WFL         Plan   Plan  Times per week: 7x/wk  Times per day: Daily  Current Treatment Recommendations: Strengthening,Balance Training,Functional Mobility Training,Endurance Training,Equipment Evaluation, Education, & procurement,Patient/Caregiver Education & Training,Safety Education & Training,Self-Care / ADL    G-Code     OutComes Score                                                  AM-PAC Score        AM-PAC Inpatient Daily Activity Raw Score: 15 (02/27/22 1132)  AM-PAC Inpatient ADL T-Scale Score : 34.69 (02/27/22 1132)  ADL Inpatient CMS 0-100% Score: 56.46 (02/27/22 1132)  ADL Inpatient CMS G-Code Modifier : CK (02/27/22 1132)    Goals  Short term goals  Time Frame for Short term goals: d/c  Short term goal 1: Pt will complete functional transfer to ADL surface with good adherence NWBing precautions and CGA  Short term goal 2: Pt will complete UB ADLs with setup  Short term goal 3: Pt will complete LB ADLs with min(A)  Short term goal 4: pt will complete toileting with min(A)  Long term goals  Time Frame for Long term goals : LTG=STG  Patient Goals   Patient goals :  To return home       Therapy Time   Individual Concurrent Group Co-treatment   Time In 1037         Time Out 1115         Minutes 38         Timed Code Treatment Minutes: [address], [address] [place] [address] [id number]

## 2022-02-27 NOTE — DISCHARGE INSTR - COC
Continuity of Care Form    Patient Name: Jose Castillo   :  1934  MRN:  [id number]    Admit date:  2022  Discharge date:  ***    Code Status Order: Full Code   Advance Directives:      Admitting Physician:  Zenaida Persaud MD  PCP: Jeyson Mahajan MD    Discharging Nurse: [facility] Unit/Room#: 9SU-7051/2108-37  Discharging Unit Phone Number: ***    Emergency Contact:   Extended Emergency Contact Information  Primary Emergency Contact: Zain Simpson  Address: [address] [place] [address], [address] [place] [address] Phone: [phone]  Relation: Spouse    Past Surgical History:  Past Surgical History:   Procedure Laterality Date    KNEE ARTHROPLASTY Right     KNEE ARTHROSCOPY      right x1, left x2    KNEE SURGERY  13    RIGHT KNEE IRRIGATION AND DEBRIDEMENT KNEE WOUND    OTHER SURGICAL HISTORY      lt ring finger    PROSTATECTOMY      TONSILLECTOMY      UPPER GASTROINTESTINAL ENDOSCOPY  3/18/16    bleeding ulcer, clipped, epi and ablated       Immunization History:   Immunization History   Administered Date(s) Administered    Influenza Whole 10/30/2015    Pneumococcal Conjugate 13-valent (Iujnwly02) 10/30/2015    Pneumococcal Conjugate 7-valent (Sonia Hails) 2007       Active Problems:  Patient Active Problem List   Diagnosis Code    S/P knee replacement Z96.659    HTN (hypertension) I10    Diabetes mellitus ([facility] E11.9    Hip pain M25.559    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 8XXA    Closed bimalleolar fracture with fracture of distal fibula S82.843A, S82.839A    Syndesmotic disruption of right ankle S93.431A       Isolation/Infection:   Isolation            No Isolation          Patient Infection Status       None to display            Nurse Assessment:  Last Vital Signs: BP (!) 101/55   Pulse 95   Temp 97.5 °F (36.4 °C) (Oral)   Resp 18   Ht 5' 10\" (1.778 m)   Wt 170 lb (77.1 kg)   SpO2 92%   BMI 24.39 kg/m²     Last documented pain score (0-10 scale): Pain Level: 0  Last Weight:   Wt Readings from Last 1 Encounters:   02/24/22 170 lb (77.1 kg)     Mental Status:  oriented and alert    IV Access:  - None    Nursing Mobility/ADLs:  Walking   Assisted  Transfer  Assisted  Bathing  Assisted  Dressing  Assisted  Toileting  Assisted  Feeding  Independent  Med Admin  Assisted  Med Delivery   whole    Wound Care Documentation and Therapy:        Elimination:  Continence: Bowel: Yes  Bladder: Yes  Urinary Catheter: None   Colostomy/Ileostomy/Ileal Conduit: No       Date of Last BM: 02/25/2022    Intake/Output Summary (Last 24 hours) at 2/27/2022 0959  Last data filed at 2/26/2022 1537  Gross per 24 hour   Intake 290 ml   Output 375 ml   Net -85 ml     I/O last 3 completed shifts: In: 56 [P.O.:240; I.V.:650]  Out: 395 [Urine:375; Blood:20]    Safety Concerns:     Sundowners [place], History of Falls (last 30 days), and At Risk for Falls    Impairments/Disabilities:      Hearing    Nutrition Therapy:  Current Nutrition Therapy:   - Oral Diet:  General    Routes of Feeding: Oral  Liquids: No Restrictions  Daily Fluid Restriction: no  Last Modified Barium Swallow with Video (Video Swallowing Test): not done    Treatments at the Time of Hospital Discharge:   Respiratory Treatments: none  Oxygen Therapy:  is not on home oxygen therapy.   Ventilator:    - No ventilator support    Rehab Therapies: Physical Therapy and Occupational Therapy  Weight Bearing Status/Restrictions: No weight bearing restirctions  Other Medical Equipment (for information only, NOT a DME order):  walker  Other Treatments: none    Patient's personal belongings (please select all that are sent with patient):  None    RN SIGNATURE:  Electronically signed by Indira Fernandez RN on 2/28/22 at 6:37 PM EST    CASE MANAGEMENT/SOCIAL WORK SECTION    Inpatient Status Date: 2/25/2022    Readmission Risk Assessment Score:  Readmission Risk              Risk of Unplanned Readmission:  13           [facility]charg[facility] Facility/ Agency   [facility]  Address: [address] [place] [address]. Phone: [phone]  Fax: [phone]      / signature: Electronically signed by NEREIDA Gibson on 2/28/22 at 4:21 PM EST    PHYSICIAN SECTION    Prognosis: Good    Condition at Discharge: Stable    Rehab Potential (if transferring to Rehab): N/A    Recommended Labs or Other Treatments After Discharge: PT/OT. CBC, CMP in 1 week. No weight bearing right leg x 2 weeks    Physician Certification: I certify the above information and transfer of Candida Jeong  is necessary for the continuing treatment of the diagnosis listed and that he requires [facility] for less 30 days.      Update Admission H&P: Changes in H&P as follows - s/p ORIF right ankle    PHYSICIAN SIGNATURE:  Electronically signed by AUSTIN Arango CNP on 2/27/22 at 10:05 AM EST

## 2022-02-27 NOTE — PROGRESS NOTES
[facility]    2/27/2022 9:53 AM        Name: Roberto Thorne . Admitted: 2/24/2022  Primary Care Provider: Cole Dalton MD (Tel: [phone])      Chief Complaint   Patient presents with    Ankle Pain     pt fell yesterday, got xrays today w/ an acute oblique displaced fracture. good pedal pulses denies hitting head , no other c/o. pt able to stand and pivot for ems. ABCs intact gcs 15 pain 2/10     Brief History: Patient is an 81 yo male with hx arthritis, prostate cancer, DM2, HTN, CLARITZA. He lives with wife in assisted living, wife currently out of state visiting daughter. Patient presented to ER with pain right ankle after mechanical fall at home on 2/23, \"I lost my balance. \" Found to have fracture distal right fibula, splint applied in ER. Has been seen by orthopedics and ORIF scheduled for 8am tomorrow. 2/26/2022: OPEN REDUCTION INTERNAL FIXATION OF RIGHT BIMALLEOLAR ANKLE FRACTURE ([facility]), SYNDESMOSIS REPAIR.       Subjective:  Presently resting in bed. Says he feels pretty good. Notes some pain right ankle but tolerable. Denies chest pain or shortness of breath. Robinson Janeer for DC home and wants to know when that will happen. Aware likely Dc to skilled unit at [facility] due to non weight bearing and he is agreeable with that. Waiting PT/OT consults.       Reviewed interval ancillary notes    Current Medications  0.45 % sodium chloride infusion, Continuous  sodium chloride flush 0.9 % injection 5-40 mL, 2 times per day  sodium chloride flush 0.9 % injection 5-40 mL, PRN  0.9 % sodium chloride infusion, PRN  aspirin EC tablet 325 mg, BID  hydroCHLOROthiazide (HYDRODIURIL) tablet 25 mg, Daily  pantoprazole (PROTONIX) tablet 40 mg, QAM AC  lisinopril (PRINIVIL;ZESTRIL) tablet 20 mg, Daily  insulin lispro (1 Unit Dial) 0-6 Units, TID WC  insulin lispro (1 Unit Dial) 0-3 Units, Nightly  glucose (GLUTOSE) 40 % oral gel 15 g, PRN  dextrose 10 % infusion, PRN  glucagon (rDNA) injection 1 mg, PRN  dextrose 5 % solution, PRN  sodium chloride flush 0.9 % injection 5-40 mL, 2 times per day  sodium chloride flush 0.9 % injection 10 mL, PRN  0.9 % sodium chloride infusion, PRN  enoxaparin (LOVENOX) injection 40 mg, Daily  ondansetron (ZOFRAN-ODT) disintegrating tablet 4 mg, Q8H PRN   Or  ondansetron (ZOFRAN) injection 4 mg, Q6H PRN  polyethylene glycol (GLYCOLAX) packet 17 g, Daily PRN  acetaminophen (TYLENOL) tablet 650 mg, Q6H PRN   Or  acetaminophen (TYLENOL) suppository 650 mg, Q6H PRN  oxyCODONE (ROXICODONE) immediate release tablet 5 mg, Q4H PRN  morphine (PF) injection 2 mg, Q4H PRN  melatonin tablet 3 mg, Nightly PRN      Objective:  BP (!) 101/55   Pulse 95   Temp 97.5 °F (36.4 °C) (Oral)   Resp 18   Ht 5' 10\" (1.778 m)   Wt 170 lb (77.1 kg)   SpO2 92%   BMI 24.39 kg/m²     Intake/Output Summary (Last 24 hours) at 2/27/2022 0953  Last data filed at 2/26/2022 1537  Gross per 24 hour   Intake 290 ml   Output 375 ml   Net -85 ml      Wt Readings from Last 3 Encounters:   02/24/22 170 lb (77.1 kg)   06/22/18 181 lb (82.1 kg)   06/19/17 181 lb (82.1 kg)      General:  Awake, alert, oriented in NAD  Skin:  Warm and dry. No unusual bruising or rash  Neck:  Supple. No JVD appreciated  Chest:  Normal effort.   Clear to auscultation, no wheezes/rhonchi/rales  Cardiovascular:  RRR, normal S1/S2, no murmur/gallop/rub  Abdomen:  Soft, nontender, +bowel sounds  Extremities:  No edema, splint in place right lower leg, toes warm, sensation intact  Neurological: No focal deficits  Psychological: Normal mood and affect    Labs and Tests:  CBC:   Recent Labs     02/25/22  0627 02/26/22  0602 02/27/22  0820   WBC 4.5 4.8 7.3   HGB 13.2* 12.5* 13.0*    170 196     BMP:    Recent Labs     02/25/22  0627 02/26/22  0602 02/27/22  0820    141 139   K 4.3 3.9 3.9    109 105   CO2 28 25 24   BUN 30* 28* 18 CREATININE 1.1 1.1 1.2   GLUCOSE 97 94 138*     Hepatic:   Recent Labs     02/25/22  0627 02/26/22  0602 02/27/22  0820   AST 25 23 22   ALT 15 17 15   BILITOT 0.3 0.4 0.4   ALKPHOS 85 69 78       Xray Right Ankle 2/25/2022:  1. Acute fracture of the distal right fibula as above remaining in continuity   with the right talus, which appears to be minimally subluxed laterally. There is no evident involvement of the ankle mortise. 2. Suspected acute avulsion fractures from the right medial malleolus and/or   talar body with overlying soft tissue swelling. Problem List  Principal Problem:    Fracture  Active Problems:    Closed bimalleolar fracture with fracture of distal fibula    Syndesmotic disruption of right ankle  Resolved Problems:    * No resolved hospital problems. *       Assessment & Plan:   1. Right ankle fracture. Secondary mechanical fall at home. S/p ORIF right ankle fracture 2/26. Splint in place. NWB x 2 weeks. Management per surgery. PT/OT consults pending. Consult case management for Dc planning. 2. DM2. Takes metformin at home, held on admission. Being covered with low dose correction. BG values controlled. 3. HTN. Controlled. Continue lisinopril and HCTZ, renal numbers stable. 4. CLARITZA, untreated. Disposition: Appears to be medically stable. Await ortho input and PT/OT consults. Anticipate DC to skilled unit at [facility] possibly later today if ortho in agreement. Diet: ADULT DIET;  Regular  Code:Full Code  DVT PPX: enoxaparin      AUSTIN Redding - CNP   2/27/2022 9:53 AM

## 2022-02-27 NOTE — PLAN OF CARE
Problem: Falls - Risk of:  Goal: Will remain free from falls  Description: Will remain free from falls  2/27/2022 1126 by Rose Gordon RN  Outcome: Ongoing  2/27/2022 0232 by Cara Corral RN  Outcome: Ongoing  Goal: Absence of physical injury  Description: Absence of physical injury  2/27/2022 1126 by Rose Gordon RN  Outcome: Ongoing  2/27/2022 0232 by Cara Corral RN  Outcome: Ongoing     Problem: Discharge Planning:  Goal: Discharged to appropriate level of care  Description: Discharged to appropriate level of care  2/27/2022 1126 by Rose Gordon RN  Outcome: Ongoing  2/27/2022 0232 by Cara Corral RN  Outcome: Ongoing     Problem: Serum Glucose Level - Abnormal:  Goal: Ability to maintain appropriate glucose levels will improve  Description: Ability to maintain appropriate glucose levels will improve  Outcome: Ongoing     Problem: Sensory Perception - Impaired:  Goal: Ability to maintain a stable neurologic state will improve  Description: Ability to maintain a stable neurologic state will improve  Outcome: Ongoing     Problem: Skin Integrity:  Goal: Will show no infection signs and symptoms  Description: Will show no infection signs and symptoms  2/27/2022 1126 by Rose Gordon RN  Outcome: Ongoing  2/27/2022 0232 by Cara Corral RN  Outcome: Ongoing  Goal: Absence of new skin breakdown  Description: Absence of new skin breakdown  2/27/2022 1126 by Rose Gordon RN  Outcome: Ongoing  2/27/2022 0232 by Cara Corral RN  Outcome: Ongoing     Problem: Pain:  Goal: Pain level will decrease  Description: Pain level will decrease  2/27/2022 1126 by Rose Gordon RN  Outcome: Ongoing  2/27/2022 0232 by Cara Corral RN  Outcome: Ongoing  Goal: Control of acute pain  Description: Control of acute pain  2/27/2022 1126 by Rose Gordon RN  Outcome: Ongoing  2/27/2022 0232 by Cara Corral RN  Outcome: Ongoing  Goal: Control of chronic pain  Description: Control of chronic pain  2/27/2022 1126 by Yusra Harris TROY Concepcion  Outcome: Ongoing  2/27/2022 0232 by Marcela Castorena RN  Outcome: Ongoing

## 2022-02-27 NOTE — PROGRESS NOTES
Physical Therapy    Facility/Department: [address] ORTHO/NEURO NURSING  Initial Assessment    NAME: Juliette Boast  : 1934  MRN: [id number]    Date of Service: 2022    Discharge Recommendations: Juliette Boast scored a 15/24 on the AM-PAC short mobility form. Current research shows that an AM-PAC score of 17 or less is typically not associated with a discharge to the patient's home setting. Based on the patient's AM-PAC score and their current functional mobility deficits, it is recommended that the patient have 3-5 sessions per week of Physical Therapy at d/c to increase the patient's independence. Please see assessment section for further patient specific details. If patient discharges prior to next session this note will serve as a discharge summary. Please see below for the latest assessment towards goals. Subacute/Skilled Nursing Facility,5-7 sessions per week   PT Equipment Recommendations  Equipment Needed: No    Assessment   Body structures, Functions, Activity limitations: Decreased functional mobility ; Decreased ADL status; Decreased ROM; Decreased strength;Decreased safe awareness;Decreased cognition;Decreased endurance;Decreased balance; Increased pain  Assessment: Patient presents below baseline function at this time. Pt cognition limiting ability to follow instructions to maintain WBing precautions with higher levels tasks such as hopping with RWx. Pt transfers using squat pivot from bed > chair with minimal difficulty, though requires cues for hand placement and reminders for WB precautions. Pt would benefit from continued skilled PT to address listed impairements for return to PLOF and dec fall risk.   Treatment Diagnosis: impaired functional mobility  Prognosis: Good  Decision Making: Low Complexity  Clinical Presentation: stable  PT Education: Goals;PT Role;Plan of Care;Precautions;Transfer Training;Gait Training;Functional Mobility Training;Weight-bearing Education  Patient Education: Pt requires reinforcement of education provided  Barriers to Learning: cognition  REQUIRES PT FOLLOW UP: Yes  Activity Tolerance  Activity Tolerance: Patient limited by cognitive status  Activity Tolerance: See cognition below       Patient Diagnosis(es): The encounter diagnosis was Closed bimalleolar fracture with fracture of distal fibula. has a past medical history of Arthritis, Cancer ([facility] [address] Diabetes mellitus ([facility] [address] Hard of hearing, Hypertension, and Obstructive sleep apnea syndrome. has a past surgical history that includes Knee arthroscopy; Prostatectomy; other surgical history; Knee Arthroplasty (Right); knee surgery (6-20-13); Upper gastrointestinal endoscopy (3/18/16); and Tonsillectomy. Restrictions  Restrictions/Precautions  Restrictions/Precautions: Weight Bearing,Fall Risk (HIGH FALL RISK)  Lower Extremity Weight Bearing Restrictions  Right Lower Extremity Weight Bearing: Non Weight Bearing (R LE)  Position Activity Restriction  Other position/activity restrictions: \"Patient is an 79 yo male with hx arthritis, prostate cancer, DM2, HTN, CLARITZA. He lives with wife in assisted living, wife currently out of state visiting daughter. Patient presented to ER with pain right ankle after mechanical fall at home on 2/23, \"I lost my balance. \" Found to have fracture distal right fibula, splint applied in ER. Has been seen by orthopedics and ORIF scheduled for 8am tomorrow. \"  Vision/Hearing  Vision: Impaired  Vision Exceptions: Wears glasses for distance (pt reports he only wears them when he watches TV)  Hearing: Exceptions to [facility]  Hearing Exceptions: Bilateral hearing aid     Subjective  General  Chart Reviewed: Yes  Patient assessed for rehabilitation services?: Yes  Family / Caregiver Present: No  General Comment  Comments: Pt supine in bed upon PT/OT arrival, agreeable to therapy evals.   Subjective  Subjective: Pt reporting 5/10 pain, worsens to 7/10 with movement of R foot/ankle  Pain Screening  Patient Currently in Pain: Yes  Pain Assessment  Pain Assessment: 0-10  Pain Level: 5  Pain Type: Surgical pain  Pain Location: Ankle  Pain Orientation: Right  Pain Descriptors: Aching;Dull  Pain Frequency: Continuous  Pain Onset: On-going  Clinical Progression: Not changed  Non-Pharmaceutical Pain Intervention(s): Repositioned; Ambulation/Increased Activity; Emotional support  Vital Signs  Patient Currently in Pain: Yes       Orientation  Orientation  Overall Orientation Status: Within Functional Limits  Social/Functional History  Social/Functional History  Lives With: Alone (cat)  Type of Home: Facility ([facility])  Home Layout: One level  Home Access: Level entry  Bathroom Shower/Tub: Shower chair with back,Walk-in shower  Bathroom Toilet: Standard  Bathroom Equipment: Grab bars in [facility] 40 wheeled walker  ADL Assistance: Independent  Homemaking Responsibilities: No  Ambulation Assistance: Independent (w/ use of 4WW)  Transfer Assistance: Independent  Active : No  Patient's  Info: medical transportation for appointments p  Additional Comments: Pt reports 1 recent fall which has lead to this admission. Pt reports for the last 2-3 years pt  Cognition   Cognition  Overall Cognitive Status: Exceptions  Arousal/Alertness: Appropriate responses to stimuli  Following Commands: Follows one step commands with repetition; Follows one step commands with increased time  Attention Span: Attends with cues to redirect  Safety Judgement: Decreased awareness of need for assistance;Decreased awareness of need for safety  Problem Solving: Decreased awareness of errors;Assistance required to identify errors made;Assistance required to generate solutions  Insights: Decreased awareness of deficits  Initiation: Requires cues for some  Sequencing: Requires cues for some  Cognition Comment: very [place], impulsive with mobility, reinforcement needed throughout to maintain NWBing precautions, however, pt able to remember and verbalize precautions throughout    Objective     Observation/Palpation  Posture: Fair  Observation: R lower leg bandaged    AROM RLE (degrees)  RLE General AROM: limited assessment due to R ankle bandage  AROM LLE (degrees)  LLE AROM : WFL  Strength RLE  Strength RLE: WFL  Comment: limited assessment to above knee joint due to R ankle restrictions  Strength LLE  Strength LLE: WFL  Tone RLE  RLE Tone: Normotonic  Tone LLE  LLE Tone: Normotonic  Motor Control  Gross Motor?: WFL  Sensation  Overall Sensation Status: WFL  Bed mobility  Supine to Sit: Stand by assistance  Scooting: Stand by assistance (cues for set up)  Transfers  Sit to Stand: Contact guard assistance (cues for hand placement on RWx)  Stand to sit: Contact guard assistance (cues for hand placement on chair)  Squat Pivot Transfers: Contact guard assistance;Minimal Assistance (CGA x 1 on first attempt, progressing to min A x 1 on second attempt; cues for hand placement)  Comment: chair x 2  Ambulation  Ambulation?: Yes  More Ambulation?: No  Ambulation 1  Surface: level tile  Device: Rolling Walker  Assistance: Minimal assistance;Maximum assistance (min A from PT to keep RWx within HOOD and cues to utilize UE's during hop technique; OT providing max A to R LE to maintain WBing precautions)  Distance: 2 ft fwd, 2 ft bwd  Comments: utilized hopping technique with RWx to maintain R LE NWB precautions; pt with difficulty coordinating movement and required frequent cues to use UE's to support weight to progress L LE  Stairs/Curb  Stairs?: No     Balance  Posture: Fair  Sitting - Static: Good  Sitting - Dynamic: Good  Standing - Static: Fair;-  Standing - Dynamic: Fair;-        Plan   Plan  Times per week: 5-7  Times per day: Daily  Current Treatment Recommendations: Strengthening,ROM,Balance Training,Functional Mobility Training,Transfer Training,ADL/Self-care Training,Gait Training,Wheelchair Mobility Training,Cognitive/Perceptual Training,Home Exercise Program  Plan Comment: Pt with many questions and repeating questions that had been previously answered regarding d/c plan and therapy while in hospital and upon d/c  Safety Devices  Type of devices:  All fall risk precautions in place,Call light within reach,Chair alarm in place,Gait belt,Patient at risk for falls,Left in chair,Telesitter in use,Nurse notified  Restraints  Initially in place: No    G-Code       OutComes Score                                                  AM-PAC Score  AM-PAC Inpatient Mobility Raw Score : 15 (02/27/22 1133)  AM-PAC Inpatient T-Scale Score : 39.45 (02/27/22 1133)  Mobility Inpatient CMS 0-100% Score: 57.7 (02/27/22 1133)  Mobility Inpatient CMS G-Code Modifier : CK (02/27/22 1133)          Goals  Short term goals  Time Frame for Short term goals: upon d/c  Short term goal 1: Pt will perform all bed mobility independently  Short term goal 2: Pt will perform squat pivot and stand pivot tranfsers with SBA and LRAD, maintaining weight bearing precautions  Short term goal 3: Pt will ambulate 10 ft with Aren and RWx  Long term goals  Time Frame for Long term goals : STG=LTG  Patient Goals   Patient goals : to return to [place] apartment       Therapy Time   Individual Concurrent Group Co-treatment   Time In       1037   Time Out       1115   Minutes       38   Timed Code Treatment Minutes: 38 Minutes       Clau Contreras PT

## 2022-02-27 NOTE — PLAN OF CARE
Problem: Falls - Risk of:  Goal: Will remain free from falls  Description: Will remain free from falls  Outcome: Ongoing     Problem: Falls - Risk of:  Goal: Absence of physical injury  Description: Absence of physical injury  Outcome: Ongoing     Problem: Discharge Planning:  Goal: Discharged to appropriate level of care  Description: Discharged to appropriate level of care  Outcome: Ongoing     Problem: Skin Integrity:  Goal: Will show no infection signs and symptoms  Description: Will show no infection signs and symptoms  Outcome: Ongoing     Problem: Skin Integrity:  Goal: Absence of new skin breakdown  Description: Absence of new skin breakdown  Outcome: Ongoing     Problem: Pain:  Goal: Pain level will decrease  Description: Pain level will decrease  Outcome: Ongoing     Problem: Pain:  Goal: Control of acute pain  Description: Control of acute pain  Outcome: Ongoing     Problem: Pain:  Goal: Control of chronic pain  Description: Control of chronic pain  Outcome: Ongoing

## 2022-02-27 NOTE — CARE COORDINATION
Discharge Planning Note:        Patient is from:  [facility]  [address], [address] [facility]  [facility]: [phone]     Patient able to return upon discharge.  [facility], to start precert.     Will continue to follow.     SAL CoyN RN       Phone: [phone]

## 2022-02-28 VITALS
RESPIRATION RATE: 16 BRPM | SYSTOLIC BLOOD PRESSURE: 148 MMHG | OXYGEN SATURATION: 95 % | DIASTOLIC BLOOD PRESSURE: 83 MMHG | HEART RATE: 91 BPM | TEMPERATURE: 97.4 F | HEIGHT: 70 IN | WEIGHT: 170 LBS | BODY MASS INDEX: 24.34 KG/M2

## 2022-02-28 LAB
ANION GAP SERPL CALCULATED.3IONS-SCNC: 7 MMOL/L (ref 3–16)
BUN BLDV-MCNC: 17 MG/DL (ref 7–20)
CALCIUM SERPL-MCNC: 9.3 MG/DL (ref 8.3–10.6)
CHLORIDE BLD-SCNC: 106 MMOL/L (ref 99–110)
CO2: 26 MMOL/L (ref 21–32)
CREAT SERPL-MCNC: 1.1 MG/DL (ref 0.8–1.3)
GFR AFRICAN AMERICAN: >60
GFR NON-AFRICAN AMERICAN: >60
GLUCOSE BLD-MCNC: 111 MG/DL (ref 70–99)
GLUCOSE BLD-MCNC: 122 MG/DL (ref 70–99)
GLUCOSE BLD-MCNC: 87 MG/DL (ref 70–99)
GLUCOSE BLD-MCNC: 89 MG/DL (ref 70–99)
GLUCOSE BLD-MCNC: 99 MG/DL (ref 70–99)
HCT VFR BLD CALC: 37.1 % (ref 40.5–52.5)
HEMOGLOBIN: 12.7 G/DL (ref 13.5–17.5)
MCH RBC QN AUTO: 33.9 PG (ref 26–34)
MCHC RBC AUTO-ENTMCNC: 34.3 G/DL (ref 31–36)
MCV RBC AUTO: 99 FL (ref 80–100)
PDW BLD-RTO: 14.1 % (ref 12.4–15.4)
PERFORMED ON: ABNORMAL
PERFORMED ON: ABNORMAL
PERFORMED ON: NORMAL
PERFORMED ON: NORMAL
PLATELET # BLD: 183 K/UL (ref 135–450)
PMV BLD AUTO: 8.1 FL (ref 5–10.5)
POTASSIUM SERPL-SCNC: 4.2 MMOL/L (ref 3.5–5.1)
RBC # BLD: 3.75 M/UL (ref 4.2–5.9)
SODIUM BLD-SCNC: 139 MMOL/L (ref 136–145)
WBC # BLD: 5.7 K/UL (ref 4–11)

## 2022-02-28 PROCEDURE — APPNB45 APP NON BILLABLE 31-45 MINUTES: Performed by: NURSE PRACTITIONER

## 2022-02-28 PROCEDURE — 97530 THERAPEUTIC ACTIVITIES: CPT

## 2022-02-28 PROCEDURE — 85027 COMPLETE CBC AUTOMATED: CPT

## 2022-02-28 PROCEDURE — 99024 POSTOP FOLLOW-UP VISIT: CPT | Performed by: NURSE PRACTITIONER

## 2022-02-28 PROCEDURE — 36415 COLL VENOUS BLD VENIPUNCTURE: CPT

## 2022-02-28 PROCEDURE — 6370000000 HC RX 637 (ALT 250 FOR IP): Performed by: ORTHOPAEDIC SURGERY

## 2022-02-28 PROCEDURE — 6360000002 HC RX W HCPCS: Performed by: ORTHOPAEDIC SURGERY

## 2022-02-28 PROCEDURE — 80048 BASIC METABOLIC PNL TOTAL CA: CPT

## 2022-02-28 RX ORDER — POLYETHYLENE GLYCOL 3350 17 G/17G
17 POWDER, FOR SOLUTION ORAL DAILY PRN
Qty: 527 G | Refills: 1
Start: 2022-02-28 | End: 2022-03-30

## 2022-02-28 RX ORDER — HYDROCODONE BITARTRATE AND ACETAMINOPHEN 5; 325 MG/1; MG/1
1 TABLET ORAL 3 TIMES DAILY PRN
Qty: 21 TABLET | Refills: 0 | Status: SHIPPED | OUTPATIENT
Start: 2022-02-28 | End: 2022-03-07

## 2022-02-28 RX ADMIN — PANTOPRAZOLE SODIUM 40 MG: 40 TABLET, DELAYED RELEASE ORAL at 07:49

## 2022-02-28 RX ADMIN — LISINOPRIL 20 MG: 20 TABLET ORAL at 10:26

## 2022-02-28 RX ADMIN — ENOXAPARIN SODIUM 40 MG: 40 INJECTION SUBCUTANEOUS at 10:26

## 2022-02-28 RX ADMIN — HYDROCHLOROTHIAZIDE 25 MG: 25 TABLET ORAL at 10:26

## 2022-02-28 ASSESSMENT — PAIN SCALES - GENERAL
PAINLEVEL_OUTOF10: 0
PAINLEVEL_OUTOF10: 0

## 2022-02-28 NOTE — DISCHARGE SUMMARY
[facility]    Patient Demographics    Patient. Anjelica De La Vega  Date of Birth. 1934  MRN. [id number]     Primary care provider. Danitza Kendall MD  (Tel: [phone])    Admit date: 2/24/2022    Discharge date (blank if same as Note Date): Note Date: 2/28/2022     Reason for Hospitalization. Chief Complaint   Patient presents with    Ankle Pain     pt fell yesterday, got xrays today w/ an acute oblique displaced fracture. good pedal pulses denies hitting head , no other c/o. pt able to stand and pivot for ems. ABCs intact gcs 15 pain 2/10         Significant Findings. Principal Problem:    Fracture  Active Problems:    Closed bimalleolar fracture with fracture of distal fibula    Syndesmotic disruption of right ankle  Resolved Problems:    * No resolved hospital problems. *       Problems and results from this hospitalization that need follow up. 1. Ortho follow up in 2 weeks. Significant test results and incidental findings. Xray Right Ankle 2/25/2022:  1. Acute fracture of the distal right fibula as above remaining in continuity   with the right talus, which appears to be minimally subluxed laterally. There is no evident involvement of the ankle mortise. 2. Suspected acute avulsion fractures from the right medial malleolus and/or   talar body with overlying soft tissue swelling.        Invasive procedures and treatments. 2/26/2022: OPEN REDUCTION INTERNAL FIXATION OF RIGHT BIMALLEOLAR ANKLE FRACTURE ([facility]eah Lower), SYNDESMOSIS REPAIR. [facility] Course. Patient is an 79 yo male with hx arthritis, prostate cancer, DM2, HTN, CLARITZA. He lives with wife in assisted living, wife currently out of state visiting daughter. Patient presented to ER with pain right ankle after mechanical fall at home on 2/23, \"I lost my balance. \" Found to have fracture distal right fibula, splint applied in ER. 1. Right ankle fracture. Secondary mechanical fall at home. S/p ORIF right ankle fracture 2/26. Splint in place. NWB x 2 weeks. PT/OT evaluated and recommended SNF. Patient discharged to [facility] skilled unit. 2. DM2. Takes metformin at home, held on admission. Covered with low dose correction. BG values controlled. Metformin resumed on DC. 3. HTN. Controlled. Continued lisinopril and HCTZ, renal numbers stable. 4. CLARITZA, untreated. Patient is doing well, eager for DC. He has minimal pain in ankle. Reviewed DC plans, follow up and medications. Expresses understanding. Questions answered. Consults. IP CONSULT TO ORTHOPEDIC SURGERY  IP CONSULT TO SOCIAL WORK  IP CONSULT TO CASE MANAGEMENT    Physical examination on discharge day. BP (!) 148/83   Pulse 91   Temp 97.4 °F (36.3 °C) (Oral)   Resp 16   Ht 5' 10\" (1.778 m)   Wt 170 lb (77.1 kg)   SpO2 95%   BMI 24.39 kg/m²   General:  Awake, alert, oriented in NAD  Skin:  Warm and dry. No unusual bruising or rash  Neck:  Supple. No JVD appreciated  Chest:  Normal effort. Clear to auscultation, no wheezes/rhonchi/rales  Cardiovascular:  RRR, normal S1/S2, no murmur/gallop/rub  Abdomen:  Soft, nontender, +bowel sounds  Extremities:  No edema, splint to right ankle, toes warm, sensation intact  Neurological: No focal deficits  Psychological: Normal mood and affect    Condition at time of discharge stable    Medication instructions provided to patient at discharge. Medication List      START taking these medications    aspirin 325 MG EC tablet  Take 1 tablet by mouth 2 times daily for 14 days     HYDROcodone-acetaminophen 5-325 MG per tablet  Commonly known as: Norco  Take 1 tablet by mouth 3 times daily as needed for Pain for up to 7 days. Intended supply: 3 days.  Take lowest dose possible to manage pain     polyethylene glycol 17 g packet  Commonly known as: GLYCOLAX  Take 17 g by mouth daily as needed for Constipation        CONTINUE taking these medications    Fish Oil 600 MG Caps     hydroCHLOROthiazide 25 MG tablet  Commonly known as: HYDRODIURIL     metFORMIN 500 MG tablet  Commonly known as: GLUCOPHAGE     MULTIPLE VITAMIN PO     pantoprazole 40 MG tablet  Commonly known as: Protonix  1 tab po two times a day for 1 month and then 1 time a day     quinapril 40 MG tablet  Commonly known as: ACCUPRIL     Vitamin D3 48 MCG (2000 UT) Caps           Where to Get Your Medications      You can get these medications from any pharmacy    Bring a paper prescription for each of these medications  · HYDROcodone-acetaminophen 5-325 MG per tablet     Information about where to get these medications is not yet available    Ask your nurse or doctor about these medications  · aspirin 325 MG EC tablet  · polyethylene glycol 17 g packet         Discharge recommendations given to patient. Follow Up. Ortho in 2 weeks   Disposition. SNF  Activity. activity as tolerated, NWB operative leg x 2 weeks  Diet: ADULT DIET; Regular      Spent > 30 minutes in discharge process.     Signed:  AUSTIN Nugent CNP     2/28/2022 9:24 AM

## 2022-02-28 NOTE — PROGRESS NOTES
Physical Therapy  Facility/Department: [address] ORTHO/NEURO NURSING  Daily Treatment Note  NAME: Cindy Jimenez  : 1934  MRN: [id number]    Date of Service: 2022    Discharge Recommendations:  Cindy Jimenez scored a  on the AM-PAC short mobility form. Current research shows that an AM-PAC score of 17 or less is typically not associated with a discharge to the patient's home setting. Based on the patient's AM-PAC score and their current functional mobility deficits, it is recommended that the patient have 3-5 sessions per week of Physical Therapy at d/c to increase the patient's independence. Please see assessment section for further patient specific details. If patient discharges prior to next session this note will serve as a discharge summary. Please see below for the latest assessment towards goals. 3-5 sessions per week   PT Equipment Recommendations  Equipment Needed: No    Assessment   Body structures, Functions, Activity limitations: Decreased functional mobility ; Decreased ADL status; Decreased ROM; Decreased strength;Decreased safe awareness;Decreased cognition;Decreased endurance;Decreased balance; Increased pain  Assessment: Patient continues to present below baseline function.  Patient needed min A of 2 to perform all OOB activity this date and needs max verbak cues for sequencing of transfer/m  Treatment Diagnosis: impaired functional mobility  Prognosis: Good  Decision Making: Low Complexity  PT Education: Goals;PT Role;Plan of Care;Precautions;Transfer Training;Gait Training;Functional Mobility Training;Weight-bearing Education  Patient Education: d/c recommendatoins, NWBIng status with mobility - Pt requires reinforcement of education provided  Barriers to Learning: cognition  REQUIRES PT FOLLOW UP: Yes  Activity Tolerance  Activity Tolerance: Patient limited by cognitive status     Patient Diagnosis(es): The encounter diagnosis was Closed bimalleolar fracture with fracture of distal fibula. has a past medical history of Arthritis, Cancer ([facility] [address] Diabetes mellitus ([address] Hard of hearing, Hypertension, and Obstructive sleep apnea syndrome. has a past surgical history that includes Knee arthroscopy; Prostatectomy; other surgical history; Knee Arthroplasty (Right); knee surgery (6-20-13); Upper gastrointestinal endoscopy (3/18/16); and Tonsillectomy. Restrictions  Restrictions/Precautions  Restrictions/Precautions: Weight Bearing,Fall Risk (HIGH FALL RISK)  Lower Extremity Weight Bearing Restrictions  Right Lower Extremity Weight Bearing: Non Weight Bearing (R LE)  Position Activity Restriction  Other position/activity restrictions: \"Patient is an 79 yo male with hx arthritis, prostate cancer, DM2, HTN, CLARITZA. He lives with wife in assisted living, wife currently out of state visiting daughter. Patient presented to ER with pain right ankle after mechanical fall at home on 2/23, \"I lost my balance. \" Found to have fracture distal right fibula, splint applied in ER. Has been seen by orthopedics and ORIF scheduled for 8am tomorrow. \"  Subjective   General  Chart Reviewed: Yes  Family / Caregiver Present: No  Subjective  Subjective: Denied pain at rest. Agreeable to therapy. Very [place]. General Comment  Comments: Supine in bed upon arrival with alarm on          Orientation  Orientation  Overall Orientation Status: Within Functional Limits (confusion noted - asking same questions frequently)  Cognition      Objective   Bed mobility  Supine to Sit: Stand by assistance  Scooting: Stand by assistance  Transfers  Sit to Stand: Dependent/Total (min A of 2 from bed to RW x 2, from chair x 2)  Stand to sit: Dependent/Total (min A of 2)  Squat Pivot Transfers: Dependent/Total (min A of 2)  Comment: Attempted Bed to chair transfer with RW initially, unable to pivot and maintain NWBing status despite assist of 2 - attempted to place R heel on ground to assist with transfer.  Returned to seated and performed squat pivot transfer for safety        Balance  Sitting - Static: Good  Sitting - Dynamic: Good  Standing - Static: Fair (min A of 2 for static standing at RW x 30 seconds x 3 trials)  Standing - Dynamic: Fair  Comments: min A of 1 for balance, min A of another to maintain NWBing status throughout session            2nd session: Alarm sounding up PT arrival. Patient had transferred self back to bed. Asking for gown change. Assisted with gown change. Patient attempting to stand 2-3x during change despite cues and education regarding NWBing status and safety. Min A provided for sit to supine. Left supine in bed with needs in reach and alarm on. AM-PAC Score  AM-PAC Inpatient Mobility Raw Score : 11 (02/28/22 1426)  AM-PAC Inpatient T-Scale Score : 33.86 (02/28/22 1426)  Mobility Inpatient CMS 0-100% Score: 72.57 (02/28/22 1426)  Mobility Inpatient CMS G-Code Modifier : CL (02/28/22 1426)          Goals  Short term goals  Time Frame for Short term goals: upon d/c  Short term goal 1: Pt will perform all bed mobility independently  Short term goal 2: Pt will perform squat pivot and stand pivot tranfsers with SBA and LRAD, maintaining weight bearing precautions  Short term goal 3: Pt will ambulate 10 ft with Aren and RWx  Long term goals  Time Frame for Long term goals : STG=LTG  Patient Goals   Patient goals : to return to [address] [place] [address]    Plan    Plan  Times per week: 7  Times per day: Daily  Current Treatment Recommendations: Strengthening,ROM,Balance Training,Functional Mobility Training,Transfer Training,ADL/Self-care Training,Gait Training,Wheelchair Mobility Training,Cognitive/Perceptual Training,Home Exercise Program  Plan Comment: Pt with many questions and repeating questions that had been previously answered regarding d/c plan and therapy while in hospital and upon d/c  Safety Devices  Type of devices:  All fall risk precautions in place,Call light within reach,Chair alarm in place,Gait belt,Patient at risk for falls,Left in chair,Telesitter in use,Nurse notified  Restraints  Initially in place: No     Therapy Time   Individual Concurrent Group Co-treatment   Time In       1258   Time Out       1330   Minutes       32   Timed Code Treatment Minutes: [address] Time:   Individual Concurrent Group Co-treatment   Time In 9032        Time Out 8325        Minutes 8          Timed Code Treatment Minutes:  40    Total Treatment Minutes:  [facility], PT    Rosanne Carey, PT, DPT [id number]  2nd session: Rosanne Carey PT, DPT [id number]

## 2022-02-28 NOTE — PROGRESS NOTES
PRN  glucagon (rDNA) injection 1 mg, PRN  dextrose 5 % solution, PRN  sodium chloride flush 0.9 % injection 5-40 mL, 2 times per day  sodium chloride flush 0.9 % injection 10 mL, PRN  0.9 % sodium chloride infusion, PRN  enoxaparin (LOVENOX) injection 40 mg, Daily  ondansetron (ZOFRAN-ODT) disintegrating tablet 4 mg, Q8H PRN   Or  ondansetron (ZOFRAN) injection 4 mg, Q6H PRN  polyethylene glycol (GLYCOLAX) packet 17 g, Daily PRN  acetaminophen (TYLENOL) tablet 650 mg, Q6H PRN   Or  acetaminophen (TYLENOL) suppository 650 mg, Q6H PRN  oxyCODONE (ROXICODONE) immediate release tablet 5 mg, Q4H PRN  morphine (PF) injection 2 mg, Q4H PRN  melatonin tablet 3 mg, Nightly PRN      Objective:  /79   Pulse 78   Temp 97.4 °F (36.3 °C) (Oral)   Resp 18   Ht 5' 10\" (1.778 m)   Wt 170 lb (77.1 kg)   SpO2 94%   BMI 24.39 kg/m²     Intake/Output Summary (Last 24 hours) at 2/28/2022 0914  Last data filed at 2/28/2022 0600  Gross per 24 hour   Intake    Output 1225 ml   Net -1225 ml      Wt Readings from Last 3 Encounters:   02/24/22 170 lb (77.1 kg)   06/22/18 181 lb (82.1 kg)   06/19/17 181 lb (82.1 kg)     General:  Awake, alert, oriented in NAD  Skin:  Warm and dry. No unusual bruising or rash  Neck:  Supple. No JVD appreciated  Chest:  Normal effort.   Clear to auscultation, no wheezes/rhonchi/rales  Cardiovascular:  RRR, normal S1/S2, no murmur/gallop/rub  Abdomen:  Soft, nontender, +bowel sounds  Extremities:  No edema, splint to right ankle, toes warm, sensation intact  Neurological: No focal deficits  Psychological: Normal mood and affect    Labs and Tests:  CBC:   Recent Labs     02/26/22  0602 02/27/22  0820   WBC 4.8 7.3   HGB 12.5* 13.0*    196     BMP:    Recent Labs     02/26/22  0602 02/27/22  0820 02/28/22  0816    139 139   K 3.9 3.9 4.2    105 106   CO2 25 24 26   BUN 28* 18 17   CREATININE 1.1 1.2 1.1   GLUCOSE 94 138* 89     Hepatic:   Recent Labs     02/26/22  0602 02/27/22  0820   AST 23 22   ALT 17 15   BILITOT 0.4 0.4   ALKPHOS 69 78       Xray Right Ankle 2/25/2022:  1. Acute fracture of the distal right fibula as above remaining in continuity   with the right talus, which appears to be minimally subluxed laterally. There is no evident involvement of the ankle mortise. 2. Suspected acute avulsion fractures from the right medial malleolus and/or   talar body with overlying soft tissue swelling. Problem List  Principal Problem:    Fracture  Active Problems:    Closed bimalleolar fracture with fracture of distal fibula    Syndesmotic disruption of right ankle  Resolved Problems:    * No resolved hospital problems. *       Assessment & Plan:   1. Right ankle fracture. Secondary mechanical fall at home. S/p ORIF right ankle fracture 2/26. Splint in place. NWB x 2 weeks. Cleared for DC by ortho. PT/OT evaluated, recommend SNF, precert pending. 2. DM2. Takes metformin at home, held on admission. Being covered with low dose correction. BG values controlled. 3. HTN. Controlled. Continue lisinopril and HCTZ, renal numbers stable. 4. CLARITZA, untreated. Disposition: Patient is medically stable. Reviewed with ortho NP, cleared for DC from their standpoint. DC order placed. Plan is [facility], awaiting precert. Diet: ADULT DIET;  Regular  Code:Full Code  DVT PPX: enoxaparin      AUSTIN Matos - CNP   2/28/2022 9:14 AM

## 2022-02-28 NOTE — PROGRESS NOTES
Shift assessment and AM vitals complete, VSS. AM meds given. Pt doing well, neuro checks WNL, ace wrap in place dressing is CDI. Pain is controlled. Waiting on precert for pt to go to SNF for rehab. The care plan and education has been reviewed and mutually agreed upon with the patient. Patient remains free from falls or accidental injury. Room free from clutter. All fall precautions in place. Bed in lowest position with wheels locked and alarm on, call light and belongings within reach, and door open.

## 2022-02-28 NOTE — PROGRESS NOTES
[facility]   Progress Note    CHIEF COMPLAINT/DIAGNOSIS: S/p RIGHT ankle ORIF     SUBJECTIVE: Patient is sitting up in bed with his foot elevated on a pillow. He reports minimal ankle pain at rest.  He denies any numbness or tingling or paresthesias. He denies any new issues. Awaiting SNF placement today. OBJECTIVE  Physical    VITALS:  /79   Pulse 78   Temp 97.4 °F (36.3 °C) (Oral)   Resp 18   Ht 5' 10\" (1.778 m)   Wt 170 lb (77.1 kg)   SpO2 94%   BMI 24.39 kg/m²     GENERAL: Alert and oriented x3, in no acute distress. MUSCULOSKELETAL: Able to wiggle all toes. INCISION:  Covered with post-op splint which is well-fitted, c/d/I. Sensory:  Intact to light touch in all toes   Vascular:   brisk cap refill in toes. Data    ALL MEDICATIONS HAVE BEEN REVIEWED    CBC: Recent Labs     02/26/22  0602 02/27/22  0820 02/28/22  0904   WBC 4.8 7.3 5.7   HGB 12.5* 13.0* 12.7*   HCT 37.0* 38.4* 37.1*    196 183     BMP:   Recent Labs     02/26/22  0602 02/27/22  0820 02/28/22  0816    139 139   K 3.9 3.9 4.2    105 106   CO2 25 24 26   BUN 28* 18 17   CREATININE 1.1 1.2 1.1     INR: No results for input(s): INR in the last 72 hours. ASSESSMENT:  S/p RIGHT Ankle ORIF (2/26/22), POD#2  DM II  HTN  CLARITZA    PLAN:   - WB status:  NO weight bearing through operative ankle; Continue splint. Reviewed post op precautions  - DVT prophylaxis: Lovenox as inpt; d/c with ASA 325mg BID x 14 days  - PT/OT  - Pain Control: [place] script. Due to orthopaedic surgical procedure/condition, patient may require pain medication for up to 6-8 weeks. - Expected post op acute blood loss anemia: H/H 2/27: 13/38.4  - ID:  post-op Ancef x 2 completed  - Dispo: per primary team; ok to d/c from our end    Follow-up with Dr. Murray Lucas in 2 weeks. Office # [phone]  No future appointments.     AUSTIN Maria - CNP  2/28/2022  9:46 AM

## 2022-02-28 NOTE — PROGRESS NOTES
Occupational Therapy  Facility/Department: [address] ORTHO/NEURO NURSING  Daily Treatment Note  NAME: Tonny Junior  : 1934  MRN: [id number]    Date of Service: 2022    Discharge Recommendations:  Tonny Junior scored a 16/24 on the AM-PAC ADL Inpatient form. Current research shows that an AM-PAC score of 17 or less is typically not associated with a discharge to the patient's home setting. Based on the patient's AM-PAC score and their current ADL deficits, it is recommended that the patient have 3-5 sessions per week of Occupational Therapy at d/c to increase the patient's independence. Please see assessment section for further patient specific details. If patient discharges prior to next session this note will serve as a discharge summary. Please see below for the latest assessment towards goals. OT Equipment Recommendations  Other: defer to next level of care    Assessment   Performance deficits / Impairments: Decreased functional mobility ; Decreased ADL status; Decreased strength;Decreased safe awareness;Decreased cognition;Decreased endurance;Decreased balance  Assessment: Pt Min A x 2 for sit pivot and static stance/transfers with Min A for NWB RLE. Pt unable to perform stand pivot. Recommend continued inpt therapy to maximize functional independence and safety. Continue with POC. Treatment Diagnosis: Decreased ADL status, functional mobility, and functional transfers d/t Fracture s/p OPEN REDUCTION INTERNAL FIXATION OF RIGHT BIMALLEOLAR ANKLE FRACTURE (Harlon Gallant), SYNDESMOSIS REPAIR   Prognosis: Good  OT Education: OT Role;Plan of Care;Transfer Training;Precautions  Patient Education: continue to reinforce  Barriers to Learning: cognition, hearing  REQUIRES OT FOLLOW UP: Yes  Activity Tolerance  Activity Tolerance: Patient Tolerated treatment well  Safety Devices  Safety Devices in place: Yes  Type of devices: All fall risk precautions in place; Left in chair;Call light within reach;Nurse notified; Chair alarm in place         Patient Diagnosis(es): The encounter diagnosis was Closed bimalleolar fracture with fracture of distal fibula. has a past medical history of Arthritis, Cancer ([address] Diabetes mellitus ([address] Hard of hearing, Hypertension, and Obstructive sleep apnea syndrome. has a past surgical history that includes Knee arthroscopy; Prostatectomy; other surgical history; Knee Arthroplasty (Right); knee surgery (6-20-13); Upper gastrointestinal endoscopy (3/18/16); Tonsillectomy; and Ankle fracture surgery (Right, 2/26/2022). Restrictions  Restrictions/Precautions  Restrictions/Precautions: Weight Bearing,Fall Risk (HIGH FALL RISK)  Lower Extremity Weight Bearing Restrictions  Right Lower Extremity Weight Bearing: Non Weight Bearing (R LE)  Position Activity Restriction  Other position/activity restrictions: \"Patient is an 81 yo male with hx arthritis, prostate cancer, DM2, HTN, CLARITZA. He lives with wife in assisted living, wife currently out of state visiting daughter. Patient presented to ER with pain right ankle after mechanical fall at home on 2/23, \"I lost my balance. \" Found to have fracture distal right fibula, splint applied in ER. Has been seen by orthopedics and ORIF scheduled for 8am tomorrow. \"  Subjective   General  Chart Reviewed: Yes  Patient assessed for rehabilitation services?: Yes  Additional Pertinent Hx: PMH: Arthritis, Cancer ([address] Diabetes mellitus ([address] Hard of hearing, Hypertension, and Obstructive sleep apnea syndrome (1/3/2017).   Response to previous treatment: Patient with no complaints from previous session  Family / Caregiver Present: No  Referring Practitioner: Kiran Burgess MD  Diagnosis: Fracture s/p OPEN REDUCTION INTERNAL FIXATION OF RIGHT BIMALLEOLAR ANKLE FRACTURE (Renata Pineda), SYNDESMOSIS REPAIR 2/26  Subjective  Subjective: Pt supine in bed upon arrival, very [place] (hearing aids not present) but pleasant and agreeable to therapy session. General Comment  Comments: Pt supine to sit SBA with bed flat and no bed rail. Pt sit EOB SBA. Pt donned LLE sneaker with setup. Pt sit to stand Min A x 2 and stand to sit Min A x 2 (assist for RLE NWB). Pt with attempted stand pivot, pt unable to perform and follow directions. Pt sit pivot Min A x 2 (assist for NWB RLE). Pt with 5 chair push-ups. Pt sit to stand Min A x 2 (assist with NWB) x 2 trials. Pt in stance ~30 sec x 2 trials Min A x 2 (assist with NWB RLE). Pt stand to sit Min A x 2 x 2 trials (assist with NWB). Call light in reach, chair alarm on and pt's questions answered. Vital Signs  Patient Currently in Pain: Denies   Orientation  Orientation  Overall Orientation Status: Within Functional Limits  Objective    ADL  Grooming: Setup (comb hair EOB)        Balance  Sitting Balance: Stand by assistance  Standing Balance:  (Min A x 2 (assist with NWB RLE))  Standing Balance  Time: ~30 sec x 3 trials  Activity: static  stance and attempted stand pivot  Bed mobility  Supine to Sit: Stand by assistance  Scooting: Stand by assistance  Transfers  Stand Pivot Transfers:  (attempted, unable to complete)  Sit Pivot Transfers:  (Min A x 2)  Sit to stand:  (Min A x 2)  Stand to sit:  (Min A x 2)                       Cognition  Overall Cognitive Status: Exceptions  Arousal/Alertness: Appropriate responses to stimuli  Following Commands: Follows one step commands with repetition; Follows one step commands with increased time  Attention Span: Attends with cues to redirect  Safety Judgement: Decreased awareness of need for assistance;Decreased awareness of need for safety  Problem Solving: Decreased awareness of errors;Assistance required to identify errors made;Assistance required to generate solutions  Insights: Decreased awareness of deficits  Initiation: Requires cues for some  Sequencing: Requires cues for some                                         Plan   Plan  Times per week: 7x/wk  Times per day: Daily  Current Treatment Recommendations: Strengthening,Balance Training,Functional Mobility Training,Endurance Training,Equipment Evaluation, Education, & procurement,Patient/Caregiver Education & Training,Safety Education & Training,Self-Care / ADL  G-Code     OutComes Score                                                  AM-PAC Score        AM-PAC Inpatient Daily Activity Raw Score: 16 (02/28/22 1522)  AM-PAC Inpatient ADL T-Scale Score : 35.96 (02/28/22 1522)  ADL Inpatient CMS 0-100% Score: 53.32 (02/28/22 1522)  ADL Inpatient CMS G-Code Modifier : CK (02/28/22 1522)    Goals  Short term goals  Time Frame for Short term goals: d/c  Short term goal 1: Pt will complete functional transfer to ADL surface with good adherence NWBing precautions and CGA - goal not met 2/28  Short term goal 2: Pt will complete UB ADLs with setup - not addressed 2/28  Short term goal 3: Pt will complete LB ADLs with min(A) - continue  Short term goal 4: pt will complete toileting with min(A) - goal not addressed 2/28  Long term goals  Time Frame for Long term goals : LTG=STG  Patient Goals   Patient goals : To return home       Therapy Time   Individual Concurrent Group Co-treatment   Time In 5822         Time Out 1330         Minutes 32         Timed Code Treatment Minutes: [address], [place] [id number]   I have reviewed and agree with the above treatment note.  Jose Luis Monday OTR/L [id number]

## 2022-02-28 NOTE — PROGRESS NOTES
2019: Assessment complete, VSS, R ankle ace wrap and splint dressing CDI, pt up to the side of the bed to use the urinal, reminded pt he is NWB to RLE, pt voiced understanding, pt able to wiggle toes, warm to touch, no numbness or tingling reported, discussed care plan, pt mutually agrees, but will need reinforcement d/t forgetfulness, fall precautions in place, elevated RLE on pillows, no other needs at this time, will continue monitoring. 2200: pt up getting OOB, more confused at this time, reoriented, assisted back to bed, gave warm blankets, fall precautions remain in place, will continue monitoring.     Annel Kaur, RN

## 2022-02-28 NOTE — CARE COORDINATION
Called Kelsea at [facility] to check on pt's precert. Stated they started it this morning and have not heard back on approval yet. Lino will call case mgmt once approved.       Electronically signed by Ramón Luke, NEREIDA, LSW on 2/28/2022 at 1:03 PM

## 2022-02-28 NOTE — CARE COORDINATION
Discharge Plan:     Patient discharged to: [facility]  SW/DC [address] faxed, [address] and [place] to: [phone]  Narcotic Prescriptions faxed were: n/a  RN: (nurse's name) will call report to: [phone] [facility] [phone]   time: 7:30pm  Family advised of discharge?: Pt and Family notified of discharged. HENS Submitted?:  Yes  All discharge needs met per case management.

## 2022-03-01 NOTE — PROGRESS NOTES
Tried to call report to SNF twice, it says call has been disconnected. Was not able to talk to nurse. Transport to  pt at [facility].

## 2022-03-17 ENCOUNTER — TELEPHONE (OUTPATIENT)
Dept: ORTHOPEDIC SURGERY | Age: 87
End: 2022-03-17

## 2022-03-17 ENCOUNTER — OFFICE VISIT (OUTPATIENT)
Dept: ORTHOPEDIC SURGERY | Age: 87
End: 2022-03-17
Payer: MEDICARE

## 2022-03-17 VITALS — HEIGHT: 70 IN | BODY MASS INDEX: 24.39 KG/M2

## 2022-03-17 DIAGNOSIS — S93.431A SYNDESMOTIC DISRUPTION OF RIGHT ANKLE, INITIAL ENCOUNTER: ICD-10-CM

## 2022-03-17 DIAGNOSIS — S82.839A CLOSED BIMALLEOLAR FRACTURE WITH FRACTURE OF DISTAL FIBULA: Primary | ICD-10-CM

## 2022-03-17 DIAGNOSIS — S82.843A CLOSED BIMALLEOLAR FRACTURE WITH FRACTURE OF DISTAL FIBULA: Primary | ICD-10-CM

## 2022-03-17 PROCEDURE — APPNB15 APP NON BILLABLE TIME 0-15 MINS: Performed by: NURSE PRACTITIONER

## 2022-03-17 PROCEDURE — 99024 POSTOP FOLLOW-UP VISIT: CPT | Performed by: NURSE PRACTITIONER

## 2022-03-17 PROCEDURE — L4361 PNEUMA/VAC WALK BOOT PRE OTS: HCPCS | Performed by: NURSE PRACTITIONER

## 2022-03-17 NOTE — TELEPHONE ENCOUNTER
Medical Facility Question     Facility Name: [facility]  Contact Name: Poppy Lowers Number: [phone] CELL  IF CALLING ON 3-18-22 PONE # [phone]  Request or Information: CLARIFICATION WEIGHT BEARING, ETC

## 2022-03-17 NOTE — TELEPHONE ENCOUNTER
Called and spoke to [place]. Notified Heidy patient is TTWB and can remove the boot to sleep and bathe. Heidy understood.  Confirmed patient will come back in 6 weeks

## 2022-03-18 NOTE — PROGRESS NOTES
DIAGNOSIS:    1-Right ankle bimalleolar displaced fracture, status post ORIF. 2-Right ankle distal tibiofibular syndesmosis disruption, s/p ORIF syndesmosis screw    DATE OF SURGERY:  2/26/2022. HISTORY OF PRESENT ILLNESS:  Mr. Ashely Linares 80 y.o.  male who came in today for 2 weeks postoperative visit. The patient denies any significant pain in the right ankle. Rates pain a 0/10 VAS mild, aching, intermittent and are improving. Aggravating factors movement. Alleviating factors rest. He has been in a splint, and non WB. No numbness or tingling sensation. No fever or Chills. He is in an ECF working with PT. Denies smoking. PHYSICAL EXAMINATION:  The incision healing well. No signs of any erythema or drainage, minimal swelling. He has no pain with the active or passive range of motion of the right ankle, but decrease ROM. He has intact sensation distally, and he is neurovascularly intact. IMAGING:  Three views right ankle taken today in the office showed anatomic alignment of the fracture, plate and screws in good position, no loosening. Ankle mortise is well centered. Syndesmosis screw intact. IMPRESSION:  2 weeks out from   1-Right ankle bimalleolar displaced fracture, status post ORIF. 2-Right ankle distal tibiofibular syndesmosis disruption, s/p ORIF syndesmosis screw    PLAN: He placed in a boot, and can start TTWB. Rest and elevate. He can take boot off for sleep, rest and shower. I have told the patient to work on ROM. ASA for DVT  The patient will come back for a follow up in 6 weeks. At that time, we will take 3 views of the right ankle standing. We will most likely keep the syndesmosis screw in and not remove. Procedures    [employer] Tall Yumi Walking Boot     Patient was prescribed a [employer] Tall Yumi Walking Boot. The right ankle will require stabilization / immobilization from this semi-rigid / rigid orthosis to improve their function.   The orthosis will assist in protecting the affected area, provide functional support and facilitate healing. Patient was instructed to progress ambulation  as non weight bearing in the device. The plan of care is to progress the patient to full weight bearing status. The patient was educated and fit by a healthcare professional with expert knowledge and specialization in brace application while under the direct supervision of the physician. Verbal and written instructions for the use of and application of this item were provided. They were instructed to contact the office immediately should the brace result in increased pain, decreased sensation, increased swelling or worsening of the condition.      Zain Levine, APRN - CNP

## 2022-04-28 ENCOUNTER — OFFICE VISIT (OUTPATIENT)
Dept: ORTHOPEDIC SURGERY | Age: 87
End: 2022-04-28

## 2022-04-28 VITALS — BODY MASS INDEX: 24.34 KG/M2 | WEIGHT: 170 LBS | HEIGHT: 70 IN

## 2022-04-28 DIAGNOSIS — S93.431A SYNDESMOTIC DISRUPTION OF RIGHT ANKLE, INITIAL ENCOUNTER: ICD-10-CM

## 2022-04-28 DIAGNOSIS — S82.839A CLOSED BIMALLEOLAR FRACTURE WITH FRACTURE OF DISTAL FIBULA: Primary | ICD-10-CM

## 2022-04-28 DIAGNOSIS — S82.843A CLOSED BIMALLEOLAR FRACTURE WITH FRACTURE OF DISTAL FIBULA: Primary | ICD-10-CM

## 2022-04-28 PROCEDURE — APPNB15 APP NON BILLABLE TIME 0-15 MINS: Performed by: NURSE PRACTITIONER

## 2022-04-28 PROCEDURE — 99024 POSTOP FOLLOW-UP VISIT: CPT | Performed by: ORTHOPAEDIC SURGERY

## 2022-04-28 NOTE — PROGRESS NOTES
DIAGNOSIS:    1-Right ankle bimalleolar displaced fracture, status post ORIF. 2-Right ankle distal tibiofibular syndesmosis disruption, s/p ORIF syndesmosis screw    DATE OF SURGERY:  2/26/2022. HISTORY OF PRESENT ILLNESS:  Mr. Yolanda Beatty 80 y.o.  male who came in today for 8 weeks postoperative visit. The patient denies any significant pain in the right ankle. Rates pain a 0/10 VAS and doing well. . He has been in a boot, and non WB. No numbness or tingling sensation. No fever or Chills. He is in an ECF working with PT. Denies smoking. PHYSICAL EXAMINATION:  The incision healing well. No signs of any erythema or drainage, minimal swelling. He has no pain with the active or passive range of motion of the right ankle, but decrease ROM. He has intact sensation distally, and he is neurovascularly intact. IMAGING:  Three views right ankle taken today in the office showed anatomic alignment of the fracture, plate and screws in good position, no loosening. Ankle mortise is well centered. Syndesmosis screw intact. IMPRESSION:  8 weeks out from   1-Right ankle bimalleolar displaced fracture, status post ORIF. 2-Right ankle distal tibiofibular syndesmosis disruption, s/p ORIF syndesmosis screw    PLAN:  He will be WBAT in the boot, and start aggressive ROM and peroneal strengthening exercise. Off the boot in 1 weeks. I discussed with the patient that I think that he would really benefit from a course of physical therapy for further strengthening and stretching. The patient will come back for a follow up in 6 weeks. At that time, we will take 3 views of the right ankle standing. We will most likely keep the syndesmosis screw in and not remove.        Nils Osgood, APRN - CNP

## 2022-06-09 ENCOUNTER — OFFICE VISIT (OUTPATIENT)
Dept: ORTHOPEDIC SURGERY | Age: 87
End: 2022-06-09

## 2022-06-09 VITALS — HEIGHT: 70 IN | BODY MASS INDEX: 24.34 KG/M2 | WEIGHT: 170 LBS

## 2022-06-09 DIAGNOSIS — S93.431D SYNDESMOTIC DISRUPTION OF RIGHT ANKLE, SUBSEQUENT ENCOUNTER: ICD-10-CM

## 2022-06-09 DIAGNOSIS — S82.839A CLOSED BIMALLEOLAR FRACTURE WITH FRACTURE OF DISTAL FIBULA: Primary | ICD-10-CM

## 2022-06-09 DIAGNOSIS — S82.843A CLOSED BIMALLEOLAR FRACTURE WITH FRACTURE OF DISTAL FIBULA: Primary | ICD-10-CM

## 2022-06-09 PROCEDURE — 1036F TOBACCO NON-USER: CPT | Performed by: ORTHOPAEDIC SURGERY

## 2022-06-09 PROCEDURE — G8420 CALC BMI NORM PARAMETERS: HCPCS | Performed by: ORTHOPAEDIC SURGERY

## 2022-06-09 PROCEDURE — 1123F ACP DISCUSS/DSCN MKR DOCD: CPT | Performed by: ORTHOPAEDIC SURGERY

## 2022-06-09 PROCEDURE — G8427 DOCREV CUR MEDS BY ELIG CLIN: HCPCS | Performed by: ORTHOPAEDIC SURGERY

## 2022-06-09 PROCEDURE — 99213 OFFICE O/P EST LOW 20 MIN: CPT | Performed by: ORTHOPAEDIC SURGERY

## 2022-06-09 NOTE — PROGRESS NOTES
DIAGNOSIS:    1-Right ankle bimalleolar displaced fracture, status post ORIF. 2-Right ankle distal tibiofibular syndesmosis disruption, s/p ORIF syndesmosis screw    DATE OF SURGERY:  2/26/2022. HISTORY OF PRESENT ILLNESS:  Charlanmandie Jews 80 y.o.  male who came in today for 3 months postoperative visit. The patient denies any significant pain in the right ankle. Rates pain a 0/10 VAS and doing well. . He has been WB using a. No numbness or tingling sensation. No fever or Chills. He is in an ECF and completed PT. Denies smoking. He is doing very well. Past Medical History:   Diagnosis Date    Arthritis     Cancer [facility])     prostate    [facility]     type 2    Hard of hearing     wears chidi aids    Hypertension     Obstructive sleep apnea syndrome 1/3/2017       Past Surgical History:   Procedure Laterality Date    ANKLE FRACTURE SURGERY Right 2/26/2022    OPEN REDUCTION INTERNAL FIXATION OF RIGHT BIMALLEOLAR ANKLE FRACTURE (Shaneka Fitting) performed by Elmer Tate MD at [facility] [address] Right     KNEE ARTHROSCOPY      right x1, left x2    KNEE SURGERY  6-20-13    RIGHT KNEE IRRIGATION AND DEBRIDEMENT KNEE WOUND    OTHER SURGICAL HISTORY      lt ring finger    PROSTATECTOMY      TONSILLECTOMY      UPPER GASTROINTESTINAL ENDOSCOPY  3/18/16    bleeding ulcer, clipped, epi and ablated       Social History     Socioeconomic History    Marital status:      Spouse name: Not on file    Number of children: Not on file    Years of education: Not on file    Highest education level: Not on file   Occupational History    Not on file   Tobacco Use    Smoking status: Never Smoker    Smokeless tobacco: Never Used   Vaping Use    Vaping Use: Never used   Substance and Sexual Activity    Alcohol use:  Yes     Alcohol/week: 1.0 standard drink     Types: 1 Shots of liquor per week    Drug use: Not on file    Sexual activity: Not on file   Other Topics Concern    Not on file   Social History Narrative    Not on file     Social Determinants of Health     Financial Resource Strain:     Difficulty of Paying Living Expenses: Not on file   Food Insecurity:     Worried About Running Out of Food in the Last Year: Not on file    Juan of Food in the Last Year: Not on file   Transportation Needs:     Lack of Transportation (Medical): Not on file    Lack of Transportation (Non-Medical): Not on file   Physical Activity:     Days of Exercise per Week: Not on file    Minutes of Exercise per Session: Not on file   Stress:     Feeling of Stress : Not on file   Social Connections:     Frequency of Communication with Friends and Family: Not on file    Frequency of Social Gatherings with Friends and Family: Not on file    Attends Orthodoxy Services: Not on file    Active Member of [address] [place] [address] [employer] or Organizations: Not on file    Attends Club or Organization Meetings: Not on file    Marital Status: Not on file   Intimate Partner Violence:     Fear of Current or Ex-Partner: Not on file    Emotionally Abused: Not on file    Physically Abused: Not on file    Sexually Abused: Not on file   Housing Stability:     Unable to Pay for Housing in the Last Year: Not on file    Number of [place] in the Last Year: Not on file    Unstable Housing in the Last Year: Not on file       Family History   Problem Relation Age of Onset    High Blood Pressure Mother     Heart Disease Father        Current Outpatient Medications on File Prior to Visit   Medication Sig Dispense Refill    aspirin 325 MG EC tablet Take 1 tablet by mouth 2 times daily for 14 days 28 tablet 0    pantoprazole (PROTONIX) 40 MG tablet 1 tab po two times a day for 1 month and then 1 time a day 60 tablet 2    metformin (GLUCOPHAGE) 500 MG tablet Take 500 mg by mouth 2 times daily (with meals).  quinapril (ACCUPRIL) 40 MG tablet Take 40 mg by mouth daily.       hydrochlorothiazide (HYDRODIURIL) 25 MG tablet Take 25 mg by mouth daily.  Cholecalciferol (VITAMIN D3) 2000 UNITS CAPS Take  by mouth daily.  MULTIPLE VITAMIN PO Take  by mouth daily.  Omega-3 Fatty Acids (FISH OIL) 600 MG CAPS Take  by mouth daily. No current facility-administered medications on file prior to visit. Pertinent items are noted in HPI  Review of systems reviewed from Patient History Form and available in the patient's chart under the Media tab. No change noted. PHYSICAL EXAMINATION:  Mr. Tabatha Kunz is a very pleasant 80 y.o.  male who presents today in no acute distress, awake, alert, and oriented. He is well dressed, nourished and  groomed. Patient with normal affect. Height is  5' 10\" (1.778 m), weight is 170 lb (77.1 kg), Body mass index is 24.39 kg/m². Resting respiratory rate is 16. He ambulates with a slow gait using a walker. The incision healing well. No signs of any erythema or drainage, minimal swelling. He has no pain with the active or passive range of motion of the right ankle, full ROM. He has intact sensation distally, and he is neurovascularly intact. Ankle reflex 1+ bilaterally. Good strength, and no instability both upper and lower extremities. IMAGING:  Three views right ankle taken today in the office showed anatomic alignment of the fracture, plate and screws in good position, no loosening. Ankle mortise is well centered. Syndesmosis screw intact. IMPRESSION: 3 months out from   1-Right ankle bimalleolar displaced fracture, status post ORIF. 2-Right ankle distal tibiofibular syndesmosis disruption, s/p ORIF syndesmosis screw    PLAN:  He can go back to normal activity with no restrictions. He will be seen PRN. I told the patient that it is not unusual to have some achy pain and swelling for up to a year after a fracture. We do not plan to remove the syndesmosis screws.       Willow Cooper MD

## 2023-09-18 ENCOUNTER — HOSPITAL ENCOUNTER (EMERGENCY)
Age: 88
Discharge: HOME | End: 2023-09-18
Payer: MEDICARE

## 2023-09-18 ENCOUNTER — APPOINTMENT (OUTPATIENT)
Dept: CT IMAGING | Age: 88
End: 2023-09-18
Payer: MEDICARE

## 2023-09-18 ENCOUNTER — APPOINTMENT (OUTPATIENT)
Dept: GENERAL RADIOLOGY | Age: 88
End: 2023-09-18
Payer: MEDICARE

## 2023-09-18 VITALS
DIASTOLIC BLOOD PRESSURE: 96 MMHG | SYSTOLIC BLOOD PRESSURE: 147 MMHG | HEART RATE: 90 BPM | OXYGEN SATURATION: 93 % | RESPIRATION RATE: 18 BRPM | TEMPERATURE: 99 F

## 2023-09-18 DIAGNOSIS — Z23 TETANUS TOXOID VACCINATION ADMINISTERED AT CURRENT VISIT: ICD-10-CM

## 2023-09-18 DIAGNOSIS — S01.01XA LACERATION OF SCALP, INITIAL ENCOUNTER: ICD-10-CM
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
## 2023-09-18 LAB
ALBUMIN SERPL-MCNC: 3.6 G/DL (ref 3.4–5)
ALBUMIN/GLOB SERPL: 1.3 {RATIO} (ref 1.1–2.2)
ALP SERPL-CCNC: 81 U/L (ref 40–129)
ALT SERPL-CCNC: 33 U/L (ref 10–40)
ANION GAP SERPL CALCULATED.3IONS-SCNC: 10 MMOL/L (ref 3–16)
AST SERPL-CCNC: 72 U/L (ref 15–37)
BACTERIA URNS QL MICRO: NORMAL /HPF
BASOPHILS # BLD: 0 K/UL (ref 0–0.2)
BASOPHILS NFR BLD: 0.5 %
BILIRUB SERPL-MCNC: <0.2 MG/DL (ref 0–1)
BILIRUB UR QL STRIP.AUTO: NEGATIVE
BUN SERPL-MCNC: 29 MG/DL (ref 7–20)
CALCIUM SERPL-MCNC: 8.8 MG/DL (ref 8.3–10.6)
CHLORIDE SERPL-SCNC: 103 MMOL/L (ref 99–110)
CLARITY UR: CLEAR
CO2 SERPL-SCNC: 25 MMOL/L (ref 21–32)
COLOR UR: YELLOW
CREAT SERPL-MCNC: 1.3 MG/DL (ref 0.8–1.3)
DEPRECATED RDW RBC AUTO: 15.5 % (ref 12.4–15.4)
EOSINOPHIL # BLD: 0 K/UL (ref 0–0.6)
EOSINOPHIL NFR BLD: 0.1 %
EPI CELLS #/AREA URNS AUTO: 1 /HPF (ref 0–5)
GFR SERPLBLD CREATININE-BSD FMLA CKD-EPI: 52 ML/MIN/{1.73_M2}
GLUCOSE SERPL-MCNC: 104 MG/DL (ref 70–99)
GLUCOSE UR STRIP.AUTO-MCNC: NEGATIVE MG/DL
HCT VFR BLD AUTO: 30.1 % (ref 40.5–52.5)
HEMOCCULT STL QL: NORMAL
HGB BLD-MCNC: 10 G/DL (ref 13.5–17.5)
HGB UR QL STRIP.AUTO: ABNORMAL
HYALINE CASTS #/AREA URNS AUTO: 4 /LPF (ref 0–8)
KETONES UR STRIP.AUTO-MCNC: ABNORMAL MG/DL
LEUKOCYTE ESTERASE UR QL STRIP.AUTO: NEGATIVE
LYMPHOCYTES # BLD: 0.4 K/UL (ref 1–5.1)
LYMPHOCYTES NFR BLD: 12.1 %
MCH RBC QN AUTO: 31.2 PG (ref 26–34)
MCHC RBC AUTO-ENTMCNC: 33.1 G/DL (ref 31–36)
MCV RBC AUTO: 94.3 FL (ref 80–100)
MONOCYTES # BLD: 0.3 K/UL (ref 0–1.3)
MONOCYTES NFR BLD: 7.4 %
NEUTROPHILS # BLD: 2.9 K/UL (ref 1.7–7.7)
NEUTROPHILS NFR BLD: 79.9 %
NITRITE UR QL STRIP.AUTO: NEGATIVE
NT-PROBNP SERPL-MCNC: 493 PG/ML (ref 0–449)
PH UR STRIP.AUTO: 5 [PH] (ref 5–8)
PLATELET # BLD AUTO: 135 K/UL (ref 135–450)
PMV BLD AUTO: 8.3 FL (ref 5–10.5)
POTASSIUM SERPL-SCNC: 4.3 MMOL/L (ref 3.5–5.1)
PROT SERPL-MCNC: 6.3 G/DL (ref 6.4–8.2)
PROT UR STRIP.AUTO-MCNC: 100 MG/DL
RBC # BLD AUTO: 3.19 M/UL (ref 4.2–5.9)
RBC CLUMPS #/AREA URNS AUTO: 0 /HPF (ref 0–4)
SODIUM SERPL-SCNC: 138 MMOL/L (ref 136–145)
SP GR UR STRIP.AUTO: 1.02 (ref 1–1.03)
TROPONIN, HIGH SENSITIVITY: 21 NG/L (ref 0–22)
TROPONIN, HIGH SENSITIVITY: 22 NG/L (ref 0–22)
UA COMPLETE W REFLEX CULTURE PNL UR: ABNORMAL
UA DIPSTICK W REFLEX MICRO PNL UR: YES
URN SPEC COLLECT METH UR: ABNORMAL
UROBILINOGEN UR STRIP-ACNC: 0.2 E.U./DL
WBC # BLD AUTO: 3.6 K/UL (ref 4–11)
WBC #/AREA URNS AUTO: 0 /HPF (ref 0–5)

## 2023-09-18 PROCEDURE — 6360000002 HC RX W HCPCS: Performed by: PHYSICIAN ASSISTANT

## 2023-09-18 PROCEDURE — 84484 ASSAY OF TROPONIN QUANT: CPT

## 2023-09-18 PROCEDURE — 82270 OCCULT BLOOD FECES: CPT

## 2023-09-18 PROCEDURE — 85025 COMPLETE CBC W/AUTO DIFF WBC: CPT

## 2023-09-18 PROCEDURE — 93005 ELECTROCARDIOGRAM TRACING: CPT | Performed by: PHYSICIAN ASSISTANT

## 2023-09-18 PROCEDURE — 71045 X-RAY EXAM CHEST 1 VIEW: CPT

## 2023-09-18 PROCEDURE — 99285 EMERGENCY DEPT VISIT HI MDM: CPT

## 2023-09-18 PROCEDURE — 72125 CT NECK SPINE W/O DYE: CPT

## 2023-09-18 PROCEDURE — 36415 COLL VENOUS BLD VENIPUNCTURE: CPT

## 2023-09-18 PROCEDURE — 83880 ASSAY OF NATRIURETIC PEPTIDE: CPT

## 2023-09-18 PROCEDURE — 12002 RPR S/N/AX/GEN/TRNK2.6-7.5CM: CPT

## 2023-09-18 PROCEDURE — 70450 CT HEAD/BRAIN W/O DYE: CPT

## 2023-09-18 PROCEDURE — 80053 COMPREHEN METABOLIC PANEL: CPT

## 2023-09-18 PROCEDURE — 6370000000 HC RX 637 (ALT 250 FOR IP): Performed by: PHYSICIAN ASSISTANT

## 2023-09-18 PROCEDURE — 90471 IMMUNIZATION ADMIN: CPT | Performed by: PHYSICIAN ASSISTANT

## 2023-09-18 PROCEDURE — 81001 URINALYSIS AUTO W/SCOPE: CPT

## 2023-09-18 PROCEDURE — 90714 TD VACC NO PRESV 7 YRS+ IM: CPT | Performed by: PHYSICIAN ASSISTANT

## 2023-09-18 RX ADMIN — CLOSTRIDIUM TETANI TOXOID ANTIGEN (FORMALDEHYDE INACTIVATED) AND CORYNEBACTERIUM DIPHTHERIAE TOXOID ANTIGEN (FORMALDEHYDE INACTIVATED) 0.5 ML: 5; 2 INJECTION, SUSPENSION INTRAMUSCULAR at 17:46

## 2023-09-18 RX ADMIN — Medication 3 ML: at 17:49

## 2023-09-18 ASSESSMENT — ENCOUNTER SYMPTOMS
RESPIRATORY NEGATIVE: 1
SHORTNESS OF BREATH: 0
COLOR CHANGE: 0
NAUSEA: 0
CONSTIPATION: 0
DIARRHEA: 0
ABDOMINAL PAIN: 0
PHOTOPHOBIA: 0
CHEST TIGHTNESS: 0
COUGH: 0
BACK PAIN: 0
VOMITING: 0

## 2023-09-18 ASSESSMENT — LIFESTYLE VARIABLES
HOW MANY STANDARD DRINKS CONTAINING ALCOHOL DO YOU HAVE ON A TYPICAL DAY: 1 OR 2
HOW OFTEN DO YOU HAVE A DRINK CONTAINING ALCOHOL: 2-4 TIMES A MONTH

## 2023-09-18 NOTE — ED PROVIDER NOTES
[facility]        Pt Name: Michelle Hooks  MRN: [id number]  [address] [place] 1934  Date of evaluation: 9/18/2023  Provider: DOMINGUEZ Mascorro  PCP: Odette Wang MD  Note Started: 5:04 PM EDT 9/18/23      ALYSIA. I have evaluated this patient. CHIEF COMPLAINT       Chief Complaint   Patient presents with    Fall    Laceration     Pt came in via [facility]. He fell and hit the night stand at the nursing home. Pt denies any pain right now. HISTORY OF PRESENT ILLNESS: 1 or more Elements     History From: Patient  Limitations to history : Hard of Hearing    Michelle Hooks is a 80 y.o. male with past medical history of diabetes, hypertension, obstructive sleep apnea who presents ED with complaint of a fall. [facility]. Reports was standing and lost his balance. Patient reports not abnormal for him to have some balance issues. Reports he fell and hit his head. Denies any loss of conscious. Has laceration to his posterior scalp/head. Was brought to the ED for further evaluation treatment. Denies headache, visual changes, speech disturbances or numbness/tingling. Denies lightheadedness or dizziness. Denies any complaints at this time. Denies any other injury or trauma throughout. Denies lightheadedness or dizziness prior to falling. Denies any chest pain or shortness of breath now or prior to falling. Unsure of last tetanus vaccine. No blood thinners reported per patient or with documentation. Nursing Notes were all reviewed and agreed with or any disagreements were addressed in the HPI. REVIEW OF SYSTEMS :      Review of Systems   Constitutional:  Negative for activity change, appetite change, chills, diaphoresis, fatigue and fever. Eyes:  Negative for photophobia and visual disturbance. Respiratory: Negative.   Negative for cough, chest tightness and shortness of

## 2023-09-18 NOTE — ED NOTES
Patient fell and hit the night stand. Approx. 4x4 gash. Pt resting in bed. Patient alert and oriented x4. GCS 15/15. Skin appropriate for ethnicity, bruises, and cuts. No signs of acute distress noted at this time. Regular respiratory pattern, normal respiratory depth, unlabored respirations. No pain. 0/10 pain. Telemetry Alarms audible and alarms set. Fall risk precautions in place, call light in reach, bed in lowest position, bed side table within reach,  2/2 bedrails up, bed alarm on, will continue to monitor. Denies any needs at this time. Call light in reach. Will continue to monitor.          Sarah Brizuela RN  09/18/23 1938

## 2023-09-19 LAB
EKG ATRIAL RATE: 87 BPM
EKG DIAGNOSIS: NORMAL
EKG P AXIS: 44 DEGREES
EKG P-R INTERVAL: 162 MS
EKG Q-T INTERVAL: 334 MS
EKG QRS DURATION: 72 MS
EKG QTC CALCULATION (BAZETT): 401 MS
EKG R AXIS: 17 DEGREES
EKG T AXIS: 39 DEGREES
EKG VENTRICULAR RATE: 87 BPM

## 2023-09-19 PROCEDURE — 93010 ELECTROCARDIOGRAM REPORT: CPT | Performed by: INTERNAL MEDICINE

## 2023-09-19 NOTE — ED NOTES
Report given to EMS crew for patient , patient left this ER in stable condition.       Pacheco Toro RN  09/18/23 [id number]

## 2023-09-19 NOTE — ED PROVIDER NOTES
EKG:  Read by me in the absence of a cardiologist shows:  Sinus rhythm, normal rate, normal conduction intervals, normal axis, no acute injury pattern, no prior EKG available for comparison     I did not perform face-to-face evaluation and was not otherwise involved in this patient's care. Please see PA/NP note for further information on this visit.         Cem Del Cid MD  09/18/23 7003

## 2023-09-19 NOTE — ED NOTES
Attempted to call [facility] [employer] multiple times for report, and was unable to speak with anyone.       Roberto Hwang RN  09/18/23 0596

## 2023-09-19 NOTE — ED NOTES
Patient typically uses walker to get around at nursing home and didn't have one here, was able to ambulate with assistance to doorway and back to the bed. Sherrie Olvera made aware.       Ignacio Cid RN  09/18/23 [id number]

## 2024-11-17 ENCOUNTER — APPOINTMENT (OUTPATIENT)
Dept: GENERAL RADIOLOGY | Age: 89
End: 2024-11-17
Payer: MEDICARE

## 2024-11-17 ENCOUNTER — HOSPITAL ENCOUNTER (EMERGENCY)
Age: 89
Discharge: HOME | End: 2024-11-18
Attending: STUDENT IN AN ORGANIZED HEALTH CARE EDUCATION/TRAINING PROGRAM
Payer: MEDICARE

## 2024-11-17 ENCOUNTER — APPOINTMENT (OUTPATIENT)
Dept: CT IMAGING | Age: 89
End: 2024-11-17
Payer: MEDICARE

## 2024-11-17 VITALS
DIASTOLIC BLOOD PRESSURE: 81 MMHG | OXYGEN SATURATION: 99 % | RESPIRATION RATE: 18 BRPM | SYSTOLIC BLOOD PRESSURE: 111 MMHG | WEIGHT: 170 LBS | TEMPERATURE: 97.7 F | BODY MASS INDEX: 24.34 KG/M2 | HEART RATE: 79 BPM | HEIGHT: 70 IN
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
## 2024-11-17 LAB
ALBUMIN SERPL-MCNC: 4 G/DL (ref 3.4–5)
ALBUMIN/GLOB SERPL: 1.4 {RATIO} (ref 1.1–2.2)
ALP SERPL-CCNC: 83 U/L (ref 40–129)
ALT SERPL-CCNC: 10 U/L (ref 10–40)
ANION GAP SERPL CALCULATED.3IONS-SCNC: 14 MMOL/L (ref 3–16)
AST SERPL-CCNC: 21 U/L (ref 15–37)
BASE EXCESS BLDV CALC-SCNC: -1.2 MMOL/L (ref -3–3)
BASOPHILS # BLD: 0 K/UL (ref 0–0.2)
BASOPHILS NFR BLD: 1 %
BILIRUB SERPL-MCNC: 0.4 MG/DL (ref 0–1)
BUN SERPL-MCNC: 25 MG/DL (ref 7–20)
CALCIUM SERPL-MCNC: 9.8 MG/DL (ref 8.3–10.6)
CHLORIDE SERPL-SCNC: 99 MMOL/L (ref 99–110)
CO2 BLDV-SCNC: 63 MMOL/L
CO2 SERPL-SCNC: 24 MMOL/L (ref 21–32)
COHGB MFR BLDV: 2.2 % (ref 0–1.5)
CREAT SERPL-MCNC: 1.1 MG/DL (ref 0.8–1.3)
DEPRECATED RDW RBC AUTO: 14.4 % (ref 12.4–15.4)
DO-HGB MFR BLDV: 41 %
EOSINOPHIL # BLD: 0.1 K/UL (ref 0–0.6)
EOSINOPHIL NFR BLD: 3.3 %
ETHANOLAMINE SERPL-MCNC: 142 MG/DL (ref 0–0.08)
FLUAV RNA RESP QL NAA+PROBE: NOT DETECTED
FLUBV RNA RESP QL NAA+PROBE: NOT DETECTED
GFR SERPLBLD CREATININE-BSD FMLA CKD-EPI: 63 ML/MIN/{1.73_M2}
GLUCOSE SERPL-MCNC: 121 MG/DL (ref 70–99)
HCO3 BLDV-SCNC: 26.2 MMOL/L (ref 23–29)
HCT VFR BLD AUTO: 40.3 % (ref 40.5–52.5)
HGB BLD-MCNC: 13.3 G/DL (ref 13.5–17.5)
LYMPHOCYTES # BLD: 1.2 K/UL (ref 1–5.1)
LYMPHOCYTES NFR BLD: 42 %
MCH RBC QN AUTO: 35.6 PG (ref 26–34)
MCHC RBC AUTO-ENTMCNC: 33.1 G/DL (ref 31–36)
MCV RBC AUTO: 107.6 FL (ref 80–100)
METHGB MFR BLDV: 0.8 %
MONOCYTES # BLD: 0.1 K/UL (ref 0–1.3)
MONOCYTES NFR BLD: 3.1 %
NEUTROPHILS # BLD: 1.4 K/UL (ref 1.7–7.7)
NEUTROPHILS NFR BLD: 50.6 %
NT-PROBNP SERPL-MCNC: 90 PG/ML (ref 0–449)
O2 CT VFR BLDV CALC: 11 VOL %
O2 THERAPY: ABNORMAL
PCO2 BLDV: 53.9 MMHG (ref 40–50)
PH BLDV: 7.3 [PH] (ref 7.35–7.45)
PLATELET # BLD AUTO: 128 K/UL (ref 135–450)
PMV BLD AUTO: 7.8 FL (ref 5–10.5)
PO2 BLDV: 36.9 MMHG (ref 25–40)
POTASSIUM SERPL-SCNC: 3.7 MMOL/L (ref 3.5–5.1)
PROT SERPL-MCNC: 6.8 G/DL (ref 6.4–8.2)
RBC # BLD AUTO: 3.74 M/UL (ref 4.2–5.9)
SAO2 % BLDV: 58 %
SARS-COV-2 RNA RESP QL NAA+PROBE: NOT DETECTED
SODIUM SERPL-SCNC: 137 MMOL/L (ref 136–145)
TROPONIN, HIGH SENSITIVITY: 18 NG/L (ref 0–22)
TROPONIN, HIGH SENSITIVITY: 19 NG/L (ref 0–22)
WBC # BLD AUTO: 2.8 K/UL (ref 4–11)

## 2024-11-17 PROCEDURE — 83880 ASSAY OF NATRIURETIC PEPTIDE: CPT

## 2024-11-17 PROCEDURE — 99285 EMERGENCY DEPT VISIT HI MDM: CPT

## 2024-11-17 PROCEDURE — 82077 ASSAY SPEC XCP UR&BREATH IA: CPT

## 2024-11-17 PROCEDURE — 71045 X-RAY EXAM CHEST 1 VIEW: CPT

## 2024-11-17 PROCEDURE — 85025 COMPLETE CBC W/AUTO DIFF WBC: CPT

## 2024-11-17 PROCEDURE — 93005 ELECTROCARDIOGRAM TRACING: CPT | Performed by: STUDENT IN AN ORGANIZED HEALTH CARE EDUCATION/TRAINING PROGRAM

## 2024-11-17 PROCEDURE — 80053 COMPREHEN METABOLIC PANEL: CPT

## 2024-11-17 PROCEDURE — 70450 CT HEAD/BRAIN W/O DYE: CPT

## 2024-11-17 PROCEDURE — 82803 BLOOD GASES ANY COMBINATION: CPT

## 2024-11-17 PROCEDURE — 84484 ASSAY OF TROPONIN QUANT: CPT

## 2024-11-17 PROCEDURE — 87636 SARSCOV2 & INF A&B AMP PRB: CPT

## 2024-11-17 ASSESSMENT — PAIN - FUNCTIONAL ASSESSMENT: PAIN_FUNCTIONAL_ASSESSMENT: NONE - DENIES PAIN

## 2024-11-18 LAB
AMPHETAMINES UR QL SCN>1000 NG/ML: NORMAL
BARBITURATES UR QL SCN>200 NG/ML: NORMAL
BENZODIAZ UR QL SCN>200 NG/ML: NORMAL
BILIRUB UR QL STRIP.AUTO: NEGATIVE
CANNABINOIDS UR QL SCN>50 NG/ML: NORMAL
CLARITY UR: CLEAR
COCAINE UR QL SCN: NORMAL
COLOR UR: YELLOW
DRUG SCREEN COMMENT UR-IMP: NORMAL
EKG ATRIAL RATE: 78 BPM
EKG DIAGNOSIS: NORMAL
EKG P AXIS: 26 DEGREES
EKG P-R INTERVAL: 200 MS
EKG Q-T INTERVAL: 366 MS
EKG QRS DURATION: 76 MS
EKG QTC CALCULATION (BAZETT): 417 MS
EKG R AXIS: -29 DEGREES
EKG T AXIS: 18 DEGREES
EKG VENTRICULAR RATE: 78 BPM
FENTANYL SCREEN, URINE: NORMAL
GLUCOSE UR STRIP.AUTO-MCNC: NEGATIVE MG/DL
HGB UR QL STRIP.AUTO: NEGATIVE
KETONES UR STRIP.AUTO-MCNC: NEGATIVE MG/DL
LEUKOCYTE ESTERASE UR QL STRIP.AUTO: NEGATIVE
METHADONE UR QL SCN>300 NG/ML: NORMAL
NITRITE UR QL STRIP.AUTO: NEGATIVE
OPIATES UR QL SCN>300 NG/ML: NORMAL
OXYCODONE UR QL SCN: NORMAL
PCP UR QL SCN>25 NG/ML: NORMAL
PH UR STRIP.AUTO: 6 [PH] (ref 5–8)
PH UR STRIP: 6 [PH]
PROT UR STRIP.AUTO-MCNC: NEGATIVE MG/DL
SP GR UR STRIP.AUTO: 1.01 (ref 1–1.03)
UA COMPLETE W REFLEX CULTURE PNL UR: NORMAL
UA DIPSTICK W REFLEX MICRO PNL UR: NORMAL
URN SPEC COLLECT METH UR: NORMAL
UROBILINOGEN UR STRIP-ACNC: 0.2 E.U./DL

## 2024-11-18 PROCEDURE — 80307 DRUG TEST PRSMV CHEM ANLYZR: CPT

## 2024-11-18 PROCEDURE — 93010 ELECTROCARDIOGRAM REPORT: CPT | Performed by: INTERNAL MEDICINE

## 2024-11-18 PROCEDURE — 81003 URINALYSIS AUTO W/O SCOPE: CPT

## 2024-11-18 NOTE — ED PROVIDER NOTES
[facility] EMERGENCY DEPARTMENT  EMERGENCY DEPARTMENT ENCOUNTER        Pt Name: Armando Lloyd  MRN: [id number]  Birthdate 1934  Date of evaluation: 11/17/2024  Provider: AUSTIN Fierro - CNP  PCP: Angel Nichols MD  Note Started: 9:19 PM EST 11/17/24       I have seen and evaluated this patient with my supervising physician kalin      CHIEF COMPLAINT       Chief Complaint   Patient presents with    Altered Mental Status     Pt via [facility] after increased altered mental status while out to eat alone. Patient is alert and oriented to self only.        HISTORY OF PRESENT ILLNESS: 1 or more Elements     History from : Patient and EMS    Limitations to history : None    Armando Lloyd is a 89 y.o. male who presents to the urgency department with concerned altered mental status.  Patient does live at [facility] on the assisted/independent side and takes an Uber to the [employer] each week.  The staff at [facility] does assist the patient to get the Uber, he is well-known at the [employer] and when he is finished eating at the [employer], the  assists him to get an Uber home.  Tonight, the [employer] staff felt as if the patient was confused, stated that he already was home as if he lived at the [employer] instead of the [facility].  Patient does tell us that the date is 1984.  I am not certain of patient's baseline mentation, there is no family at bedside.  EMS told staff that patient does have dementia.    He is in no distress.  No focal weakness.  NIH stroke scale is 0.    Nursing Notes were all reviewed and agreed with or any disagreements were addressed in the HPI.    REVIEW OF SYSTEMS :      Review of Systems   Constitutional:  Negative for activity change, chills and fever.   Respiratory:  Negative for chest tightness and shortness of breath.    Cardiovascular:  Negative for chest pain.   Gastrointestinal:  Negative for abdominal pain, 
 The Ekg interpreted by me in the absence of a cardiologist shows.  Normal sinus rhythm with a ventricular rate of 78.  Axis normal.  QTc appropriate.  No specific ST or T wave abnormality.  No significant change compared to prior 9-18-23.      I only performed EKG interpretation and was not otherwise involved in the care of this patient.       Win Vela MD  11/17/24 2770    
QRSBorderline ECG          IMAGING RESULTS     XR CHEST PORTABLE   Final Result   Borderline cardiomegaly and mild central pulmonary congestion which is more   prominent      Mild bibasilar atelectasis or infiltrates which is more prominent.         CT HEAD WO CONTRAST   Final Result   No acute intracranial abnormality.         Any applicable radiology studies including x-ray, CT, MRI, and/or ultrasound were reviewed independently by me in addition to the radiologist.  I reviewed all radiology images and reports as well from this evaluation.        MEDICAL DECISION MAKING     In addition to the advanced practice provider, I personally saw Armando Lloyd and performed a substantive portion of the visit including all aspects of the medical decision making. I made/approved the management plan and take responsibility for the patient management     Patient presented with Altered Mental Status (Pt via [facility] after increased altered mental status while out to eat alone. Patient is alert and oriented to self only. ) as described above.  History obtained from patient and ALYSIA.  Prior medical history reviewed.  Initial vital signs reviewed and within normal limits.  Patient nontoxic appearing and stable.  Physical exam as above.    Patient has been thoroughly evaluated for altered mental status by ALYSIA.  I reviewed workup performed by ALYSIA per ED course.  Patient's workup without definitive cause for patient's reported altered mental status.  He denies all complaints.  This is likely secondary to alcohol intoxication.  Will continue to monitor patient in the emergency department with plan for likely outpatient management.    I independently interpreted EKG (see full interpretation above), lab work (per ED course), and imaging (per ED course).    ED Course as of 11/18/24 0157   Sun Nov 17, 2024 2204 WBC(!): 2.8 [PB]   2204 Hemoglobin Quant(!): 13.3 [PB]   2204 Potassium: 3.7 [PB]   2204 Creatinine: 1.1 [PB]   2204

## (undated) DEVICE — GAUZE,SPONGE,4"X4",8PLY,STRL,LF,10/TRAY: Brand: MEDLINE

## (undated) DEVICE — STERILE LATEX POWDER-FREE SURGICAL GLOVESWITH NITRILE COATING: Brand: PROTEXIS

## (undated) DEVICE — GLOVE 8 LTX ST TRIFLEX POWDER LK

## (undated) DEVICE — ZIMMER® STERILE DISPOSABLE TOURNIQUET CUFF WITH PLC, DUAL PORT, SINGLE BLADDER, 34 IN. (86 CM)

## (undated) DEVICE — SYRINGE, LUER LOCK, 10ML: Brand: MEDLINE

## (undated) DEVICE — DRESSING,GAUZE,XEROFORM,CURAD,1"X8",ST: Brand: CURAD

## (undated) DEVICE — TRAY PREP DRY W/ PREM GLV 2 APPL 6 SPNG 2 UNDPD 1 OVERWRAP

## (undated) DEVICE — ELECTRODE PT RET AD L9FT HI MOIST COND ADH HYDRGEL CORDED

## (undated) DEVICE — BANDAGE COMPR W6INXL10YD ST M E WHITE/BEIGE

## (undated) DEVICE — C-ARM: Brand: UNBRANDED

## (undated) DEVICE — T-DRAPE,EXTREMITY,STERILE: Brand: MEDLINE

## (undated) DEVICE — TOWEL,OR,DSP,ST,BLUE,STD,6/PK,12PK/CS: Brand: MEDLINE

## (undated) DEVICE — HYPODERMIC SAFETY NEEDLE: Brand: MAGELLAN

## (undated) DEVICE — MERCY FAIRFIELD TURNOVER KIT: Brand: MEDLINE INDUSTRIES, INC.

## (undated) DEVICE — FRAZIER SUCTION INSTRUMENT 8 FR W/CONTROL VENT & OBTURATOR: Brand: FRAZIER

## (undated) DEVICE — BANDAGE,ELASTIC,ESMARK,STERILE,6"X9',LF: Brand: MEDLINE

## (undated) DEVICE — SUTURE VCRL + SZ 3-0 L27IN ABSRB UD L26MM SH 1/2 CIR VCP416H

## (undated) DEVICE — CAST BL 15X4IN SPECIALIST PLASTER

## (undated) DEVICE — INTENDED FOR TISSUE SEPARATION, AND OTHER PROCEDURES THAT REQUIRE A SHARP SURGICAL BLADE TO PUNCTURE OR CUT.: Brand: BARD-PARKER ® STAINLESS STEEL BLADES

## (undated) DEVICE — APPLICATOR MEDICATED 26 CC SOLUTION HI LT ORNG CHLORAPREP

## (undated) DEVICE — MAJOR SET UP PK

## (undated) DEVICE — BIT DRL L100MM DIA2.5MM FOR SM FRAG UNIV LOK SYS

## (undated) DEVICE — SUTURE VCRL + SZ 2-0 L18IN ABSRB UD CT1 L36MM 1/2 CIR VCP839D

## (undated) DEVICE — DRAPE,U/ SHT,SPLIT,PLAS,STERIL: Brand: MEDLINE

## (undated) DEVICE — COVER LT HNDL BLU PLAS

## (undated) DEVICE — BIT DRL DIA2MM STD QUIK CONN FOR PERIARTC LOK PLT SYS

## (undated) DEVICE — COTTON UNDERCAST PADDING,CRIMPED FINISH: Brand: WEBRIL

## (undated) DEVICE — Z DISCONTINUED USE 2272117 DRAPE SURG 3 QTR N INVASIVE 2 LAYR DISP